# Patient Record
Sex: FEMALE | Race: WHITE | Employment: OTHER | URBAN - METROPOLITAN AREA
[De-identification: names, ages, dates, MRNs, and addresses within clinical notes are randomized per-mention and may not be internally consistent; named-entity substitution may affect disease eponyms.]

---

## 2019-01-24 ENCOUNTER — OP HISTORICAL/CONVERTED ENCOUNTER (OUTPATIENT)
Dept: OTHER | Age: 69
End: 2019-01-24

## 2020-10-29 RX ORDER — TRETINOIN 0.5 MG/G
CREAM TOPICAL
COMMUNITY
End: 2020-10-29 | Stop reason: SDUPTHER

## 2020-10-29 RX ORDER — TELMISARTAN AND HYDROCHLORTHIAZIDE 40; 12.5 MG/1; MG/1
1 TABLET ORAL DAILY
Qty: 30 TAB | Refills: 2 | Status: SHIPPED | OUTPATIENT
Start: 2020-10-29 | End: 2021-01-25 | Stop reason: SDUPTHER

## 2020-10-29 RX ORDER — TELMISARTAN AND HYDROCHLORTHIAZIDE 40; 12.5 MG/1; MG/1
1 TABLET ORAL DAILY
COMMUNITY
End: 2020-10-29 | Stop reason: SDUPTHER

## 2020-10-29 RX ORDER — TRETINOIN 0.5 MG/G
CREAM TOPICAL
Qty: 20 G | Refills: 4 | Status: SHIPPED | OUTPATIENT
Start: 2020-10-29 | End: 2020-11-09 | Stop reason: ALTCHOICE

## 2020-11-09 ENCOUNTER — OFFICE VISIT (OUTPATIENT)
Dept: FAMILY MEDICINE CLINIC | Age: 70
End: 2020-11-09
Payer: MEDICARE

## 2020-11-09 VITALS
HEART RATE: 92 BPM | SYSTOLIC BLOOD PRESSURE: 184 MMHG | DIASTOLIC BLOOD PRESSURE: 71 MMHG | TEMPERATURE: 97.3 F | HEIGHT: 65 IN | BODY MASS INDEX: 20.83 KG/M2 | OXYGEN SATURATION: 95 % | WEIGHT: 125 LBS

## 2020-11-09 DIAGNOSIS — Z12.31 SCREENING MAMMOGRAM, ENCOUNTER FOR: ICD-10-CM

## 2020-11-09 DIAGNOSIS — M81.0 OSTEOPOROSIS, UNSPECIFIED OSTEOPOROSIS TYPE, UNSPECIFIED PATHOLOGICAL FRACTURE PRESENCE: ICD-10-CM

## 2020-11-09 DIAGNOSIS — R05.9 COUGH: ICD-10-CM

## 2020-11-09 DIAGNOSIS — I10 ESSENTIAL HYPERTENSION: Primary | ICD-10-CM

## 2020-11-09 DIAGNOSIS — E78.2 MIXED HYPERLIPIDEMIA: ICD-10-CM

## 2020-11-09 PROCEDURE — 99213 OFFICE O/P EST LOW 20 MIN: CPT | Performed by: FAMILY MEDICINE

## 2020-11-09 RX ORDER — BISMUTH SUBSALICYLATE 262 MG
1 TABLET,CHEWABLE ORAL DAILY
COMMUNITY

## 2020-11-09 RX ORDER — ALENDRONATE SODIUM 70 MG/1
TABLET ORAL
COMMUNITY
End: 2020-11-09 | Stop reason: ALTCHOICE

## 2020-11-09 RX ORDER — GUAIFENESIN 600 MG/1
600 TABLET, EXTENDED RELEASE ORAL 2 TIMES DAILY
Qty: 60 TAB | Refills: 2 | Status: SHIPPED | OUTPATIENT
Start: 2020-11-09 | End: 2021-05-10 | Stop reason: ALTCHOICE

## 2020-11-09 RX ORDER — ATORVASTATIN CALCIUM 20 MG/1
TABLET, FILM COATED ORAL DAILY
COMMUNITY
End: 2021-01-25 | Stop reason: SDUPTHER

## 2020-11-09 NOTE — PATIENT INSTRUCTIONS
General Health and concerns:  HEART HEALTHY DIET:  A heart healthy diet is one that is low in cholesterol (less than 300 mg daily), fat (less than 80 g daily) . You should also minimize carbohydrates / sugars (less amounts of breads, pastas, potato and potato products and sugary foods/snacks, cookies, cakes, etc) . Try to eat whole wheat/multigrain breads and pastas and eat more vegetables. Cook with olive oil (or no oil) and grill, bake, broil or boil foods. Less red meat and more chicken , fish and lean cuts of beef (limited). 2792-2663 calories per day is sufficient [id number] is acceptable for weight loss. EXERCISE:  You should do exercise 3-5 days per week (minimum) to include increasing your heart rate for 30 to 45 minutes. At least a pace of a brisk walk should do that. This build up your heart and lung endurance and muscles and helps many function of the body. OTHER:        Routine Health maintenance: You need to get a yearly follow up/physical exam to review, discuss age and gender appropriate exams, labs, vaccines and screening tests. This includes cardiovascular health risk, cancer screens and other concepcion related topics. Medications-take all medications as directed. Please do not stop unless you talk to your doctor or health care provider first. Report any problems immediately. Referrals: if you have been given a referral, please call the office if you do not hear from provider in one week. You may make the appointment yourself. Please keep all appointments with specialists and ask them to send their notes, thoughts, recommendations to us , as your PCP. Imaging/Labs:  Be sure to get these images in a timely manner. IF your test must be scheduled, let us know if you need help getting this done and if you do not hear from that provider in a week , call us or them.   BE SURE to call the office if you do not hear regarding the results in one week after the test is performed Image or lab). It is our intention to inform you of the results ALWAYS, even if normal you should get a notification (Call, portal message). PLEASE eric if you do not get the results. PLEASE follow all recommendations and call/come in /ask questions if you do not understand of if problems develop after or in between visits. Failure to comply with recommended health care advise could result in serious health consequences. Thank you for choosing our practice and please let us know how we can help you feel better and stay well!

## 2020-11-17 ENCOUNTER — HOSPITAL ENCOUNTER (OUTPATIENT)
Dept: GENERAL RADIOLOGY | Age: 70
Discharge: HOME | End: 2020-11-17
Payer: MEDICARE

## 2020-11-17 DIAGNOSIS — R05.9 COUGH: ICD-10-CM

## 2020-11-17 PROCEDURE — 71046 X-RAY EXAM CHEST 2 VIEWS: CPT

## 2020-11-18 LAB
ALBUMIN SERPL-MCNC: 4.7 G/DL (ref 3.8–4.8)
ALBUMIN/GLOB SERPL: 2.5 {RATIO} (ref 1.2–2.2)
ALP SERPL-CCNC: 61 IU/L (ref 39–117)
ALT SERPL-CCNC: 19 IU/L (ref 0–32)
APPEARANCE UR: CLEAR
AST SERPL-CCNC: 23 IU/L (ref 0–40)
BASOPHILS # BLD AUTO: 0 X10E3/UL (ref 0–0.2)
BASOPHILS NFR BLD AUTO: 1 %
BILIRUB SERPL-MCNC: 0.5 MG/DL (ref 0–1.2)
BILIRUB UR QL STRIP: NEGATIVE
BUN SERPL-MCNC: 12 MG/DL (ref 8–27)
BUN/CREAT SERPL: 21 (ref 12–28)
CALCIUM SERPL-MCNC: 9.3 MG/DL (ref 8.7–10.3)
CHLORIDE SERPL-SCNC: 88 MMOL/L (ref 96–106)
CHOLEST SERPL-MCNC: 171 MG/DL (ref 100–199)
CO2 SERPL-SCNC: 25 MMOL/L (ref 20–29)
COLOR UR: YELLOW
CREAT SERPL-MCNC: 0.56 MG/DL (ref 0.57–1)
EOSINOPHIL # BLD AUTO: 0.1 X10E3/UL (ref 0–0.4)
EOSINOPHIL NFR BLD AUTO: 1 %
ERYTHROCYTE [DISTWIDTH] IN BLOOD BY AUTOMATED COUNT: 11.7 % (ref 11.7–15.4)
GLOBULIN SER CALC-MCNC: 1.9 G/DL (ref 1.5–4.5)
GLUCOSE SERPL-MCNC: 94 MG/DL (ref 65–99)
GLUCOSE UR QL: NEGATIVE
HCT VFR BLD AUTO: 36.6 % (ref 34–46.6)
HDLC SERPL-MCNC: 90 MG/DL
HGB BLD-MCNC: 12.2 G/DL (ref 11.1–15.9)
HGB UR QL STRIP: NEGATIVE
IMM GRANULOCYTES # BLD AUTO: 0 X10E3/UL (ref 0–0.1)
IMM GRANULOCYTES NFR BLD AUTO: 0 %
KETONES UR QL STRIP: NEGATIVE
LDLC SERPL CALC-MCNC: 68 MG/DL (ref 0–99)
LEUKOCYTE ESTERASE UR QL STRIP: NEGATIVE
LYMPHOCYTES # BLD AUTO: 2.5 X10E3/UL (ref 0.7–3.1)
LYMPHOCYTES NFR BLD AUTO: 29 %
MCH RBC QN AUTO: 31.7 PG (ref 26.6–33)
MCHC RBC AUTO-ENTMCNC: 33.3 G/DL (ref 31.5–35.7)
MCV RBC AUTO: 95 FL (ref 79–97)
MICRO URNS: ABNORMAL
MONOCYTES # BLD AUTO: 0.7 X10E3/UL (ref 0.1–0.9)
MONOCYTES NFR BLD AUTO: 8 %
NEUTROPHILS # BLD AUTO: 5.2 X10E3/UL (ref 1.4–7)
NEUTROPHILS NFR BLD AUTO: 61 %
NITRITE UR QL STRIP: NEGATIVE
PH UR STRIP: 8.5 [PH] (ref 5–7.5)
PLATELET # BLD AUTO: 371 X10E3/UL (ref 150–450)
POTASSIUM SERPL-SCNC: 4.5 MMOL/L (ref 3.5–5.2)
PROT SERPL-MCNC: 6.6 G/DL (ref 6–8.5)
PROT UR QL STRIP: NEGATIVE
RBC # BLD AUTO: 3.85 X10E6/UL (ref 3.77–5.28)
SODIUM SERPL-SCNC: 128 MMOL/L (ref 134–144)
SP GR UR: 1.01 (ref 1–1.03)
TRIGL SERPL-MCNC: 65 MG/DL (ref 0–149)
TSH SERPL DL<=0.005 MIU/L-ACNC: 0.97 UIU/ML (ref 0.45–4.5)
UROBILINOGEN UR STRIP-MCNC: 0.2 MG/DL (ref 0.2–1)
VLDLC SERPL CALC-MCNC: 13 MG/DL (ref 5–40)
WBC # BLD AUTO: 8.5 X10E3/UL (ref 3.4–10.8)

## 2020-11-19 NOTE — PROGRESS NOTES
Blood count is normal without infection or anemia  Sodium is a little low.   Need to recheck BMP and see, sometimes with overhydration it will get low OR the telmisartan with hctz may have to be changed  Kidney function and liver function are normal  Cholesterol is good  Urine is nromal

## 2020-11-23 DIAGNOSIS — E78.2 MIXED HYPERLIPIDEMIA: Primary | ICD-10-CM

## 2020-11-23 DIAGNOSIS — E87.1 HYPONATREMIA: ICD-10-CM

## 2020-11-23 NOTE — PROGRESS NOTES
72 y.o. , Jeffrey Ch , female       No Known Allergies  Current Outpatient Medications on File Prior to Visit   Medication Sig Dispense Refill    atorvastatin (LIPITOR) 20 mg tablet Take  by mouth daily.  aspirin (ASPIR-81 PO) Take  by mouth.  ubidecarenone (CO Q-10 PO) Take  by mouth.  ascorbic acid (VITAMIN C PO) Take  by mouth.  multivitamin (ONE A DAY) tablet Take 1 Tab by mouth daily.  guaiFENesin ER (MUCINEX) 600 mg ER tablet Take 1 Tab by mouth two (2) times a day. 60 Tab 2    telmisartan-hydroCHLOROthiazide (MICARDIS HCT) 40-12.5 mg per tablet Take 1 Tab by mouth daily. 30 Tab 2     No current facility-administered medications on file prior to visit. Patient Active Problem List   Diagnosis Code    Hypertension I10    Hyperlipidemia E78.5    Osteoporosis M81.0       1. Mixed hyperlipidemia    - METABOLIC PANEL, COMPREHENSIVE    2.  Hyponatremia    - METABOLIC PANEL, COMPREHENSIVE      Arturo Mayorga DO

## 2020-12-07 ENCOUNTER — HOSPITAL ENCOUNTER (OUTPATIENT)
Dept: MAMMOGRAPHY | Age: 70
Discharge: HOME | End: 2020-12-07
Attending: FAMILY MEDICINE
Payer: MEDICARE

## 2020-12-07 DIAGNOSIS — Z12.31 SCREENING MAMMOGRAM, ENCOUNTER FOR: ICD-10-CM

## 2020-12-07 PROCEDURE — 77067 SCR MAMMO BI INCL CAD: CPT

## 2020-12-29 LAB
ALBUMIN SERPL-MCNC: 4.5 G/DL (ref 3.8–4.8)
ALBUMIN/GLOB SERPL: 2.4 {RATIO} (ref 1.2–2.2)
ALP SERPL-CCNC: 79 IU/L (ref 39–117)
ALT SERPL-CCNC: 16 IU/L (ref 0–32)
AST SERPL-CCNC: 19 IU/L (ref 0–40)
BILIRUB SERPL-MCNC: 0.2 MG/DL (ref 0–1.2)
BUN SERPL-MCNC: 11 MG/DL (ref 8–27)
BUN/CREAT SERPL: 16 (ref 12–28)
CALCIUM SERPL-MCNC: 9.2 MG/DL (ref 8.7–10.3)
CHLORIDE SERPL-SCNC: 95 MMOL/L (ref 96–106)
CO2 SERPL-SCNC: 26 MMOL/L (ref 20–29)
CREAT SERPL-MCNC: 0.67 MG/DL (ref 0.57–1)
GLOBULIN SER CALC-MCNC: 1.9 G/DL (ref 1.5–4.5)
GLUCOSE SERPL-MCNC: 131 MG/DL (ref 65–99)
POTASSIUM SERPL-SCNC: 4.5 MMOL/L (ref 3.5–5.2)
PROT SERPL-MCNC: 6.4 G/DL (ref 6–8.5)
SODIUM SERPL-SCNC: 134 MMOL/L (ref 134–144)

## 2020-12-29 NOTE — PROGRESS NOTES
The glucose is 131. This is not diabetic but self. However, it is up from last visit. I don't know that she was fasting if not that's okay. However, so we may want to do an A1c. However, I believe we are rechecking her sodium which is much better. So she may continue well currently what she is doing.

## 2021-01-04 ENCOUNTER — TELEPHONE (OUTPATIENT)
Dept: FAMILY MEDICINE CLINIC | Age: 71
End: 2021-01-04

## 2021-01-04 NOTE — TELEPHONE ENCOUNTER
Spoke with pt. States that she has been having burning and numbness in feet for a long time. She keeps forgetting to mention it when she comes in. Wants to know if she should come in to see you.

## 2021-01-11 ENCOUNTER — OFFICE VISIT (OUTPATIENT)
Dept: FAMILY MEDICINE CLINIC | Age: 71
End: 2021-01-11
Payer: MEDICARE

## 2021-01-11 VITALS
TEMPERATURE: 98 F | HEART RATE: 87 BPM | OXYGEN SATURATION: 97 % | BODY MASS INDEX: 20.99 KG/M2 | WEIGHT: 126 LBS | DIASTOLIC BLOOD PRESSURE: 73 MMHG | SYSTOLIC BLOOD PRESSURE: 160 MMHG | HEIGHT: 65 IN

## 2021-01-11 DIAGNOSIS — E78.2 MIXED HYPERLIPIDEMIA: ICD-10-CM

## 2021-01-11 DIAGNOSIS — G62.9 NEUROPATHY: ICD-10-CM

## 2021-01-11 DIAGNOSIS — I10 ESSENTIAL HYPERTENSION: Primary | ICD-10-CM

## 2021-01-11 PROCEDURE — 99214 OFFICE O/P EST MOD 30 MIN: CPT | Performed by: FAMILY MEDICINE

## 2021-01-11 RX ORDER — DIPHENHYDRAMINE CITRATE AND IBUPROFEN 38; 200 MG/1; MG/1
TABLET, COATED ORAL
COMMUNITY
End: 2021-06-16 | Stop reason: ALTCHOICE

## 2021-01-11 NOTE — PATIENT INSTRUCTIONS
     
Routine Health maintenance: You need to get a yearly follow up/physical exam to review, discuss age and gender appropriate exams, labs, vaccines and screening tests. This includes cardiovascular health risk, cancer screens and other concepcion related topics. Medications-Take all medications as directed. Please do not stop unless you talk to your doctor or health care provider first. Report any problems immediately. Referrals: if you have been given a referral, please call the office if you do not hear from provider in one week. You may make the appointment yourself. Please keep all appointments with specialists and ask them to send their notes, thoughts, recommendations to us , as your PCP. Imaging/Labs:  Be sure to get these images in a timely manner. IF your test must be scheduled, let us know if you need help getting this done and if you do not hear from that provider in a week , call us or them. BE SURE to call the office if you do not hear regarding the results in one week after the test is performed Image or lab). It is our intention to inform you of the results ALWAYS, even if normal you should get a notification (Call, portal message). PLEASE eric if you do not get the results. PLEASE follow all recommendations and call/come in /ask questions if you do not understand of if problems develop after or in between visits. Failure to comply with recommended health care advise could result in serious health consequences. Thank you for choosing our practice and please let us know how we can help you feel better and stay well!

## 2021-01-11 NOTE — PROGRESS NOTES
IIDENTIFYING INFORMATION:  Dory Dumont. Jessica , 72 y.o., female      CHIEF COMPLAINT:   Chief Complaint   Patient presents with    Numbness     c/o tingling and burning sensation in feet. HISTORY OF PRESENT ILLNESS:    Christopher Cuadra is a 72 y.o. female with the below listed medical problems whom comes in today for numbness feet, longstanding just forgot to tell us. NO longer smokes and thought it was a circulatory issue from that. Quit smoking a couple months prior. She has no diabetes pper se and labs, including cbc, cmp, lipids, tsh normal in 2020. Nsame regardless of what she does but worse at night. Just burning and tingling, occasionally numb, bother feet. PAST MEDICAL HISTORY:   Patient Active Problem List   Diagnosis Code    Hypertension I10    Hyperlipidemia E78.5    Osteoporosis M81.0        ALLERGIES:   No Known Allergies      MEDICATIONS:     Current Outpatient Medications:     Ibuprofen-diphenhydrAMINE (Advil PM) 200-38 mg tab, Take  by mouth., Disp: , Rfl:     atorvastatin (LIPITOR) 20 mg tablet, Take  by mouth daily. , Disp: , Rfl:     aspirin (ASPIR-81 PO), Take  by mouth., Disp: , Rfl:     ubidecarenone (CO Q-10 PO), Take  by mouth., Disp: , Rfl:     ascorbic acid (VITAMIN C PO), Take  by mouth., Disp: , Rfl:     multivitamin (ONE A DAY) tablet, Take 1 Tab by mouth daily. , Disp: , Rfl:     guaiFENesin ER (MUCINEX) 600 mg ER tablet, Take 1 Tab by mouth two (2) times a day., Disp: 60 Tab, Rfl: 2    telmisartan-hydroCHLOROthiazide (MICARDIS HCT) 40-12.5 mg per tablet, Take 1 Tab by mouth daily. , Disp: 30 Tab, Rfl: 2    SOCIAL HISTORY:   Social History     Tobacco Use    Smoking status: Former Smoker     Quit date: 2020     Years since quittin.3    Smokeless tobacco: Never Used   Substance Use Topics    Alcohol use:  Yes    Drug use: Never       SURGICAL HISTORY:  Past Surgical History:   Procedure Laterality Date    HX COLONOSCOPY      due 2019 but due to COVID-19 will wait    HX HEENT      open up duct in eye          FAMILY HISTORY:  History reviewed. No pertinent family history. REVIEW OF SYSTEMS:  I personally collected this information from all available source present (patient/others in room and records available) -JLEWISDO    Review of Systems   Constitutional: Negative for activity change, appetite change, chills, diaphoresis, fever and unexpected weight change. HENT: Negative for congestion, ear discharge, ear pain, hearing loss, rhinorrhea, sinus pressure, sneezing, sore throat, tinnitus, trouble swallowing and voice change. Eyes: Negative for photophobia, pain, discharge and visual disturbance. Respiratory: Negative for cough, chest tightness, shortness of breath and wheezing. Cardiovascular: Negative for chest pain, palpitations and leg swelling. Gastrointestinal: Negative for abdominal distention, abdominal pain, blood in stool, constipation, diarrhea, nausea and vomiting. Endocrine: Negative for cold intolerance, heat intolerance, polydipsia and polyphagia. Genitourinary: Negative for difficulty urinating, dysuria, frequency, hematuria and urgency. Musculoskeletal: Negative for arthralgias, back pain, gait problem, joint swelling, myalgias and neck pain. Skin: Negative for color change and rash. Neurological: Positive for numbness (and burning, and tingling). Negative for dizziness, tremors, syncope, speech difficulty, weakness, light-headedness and headaches. Hematological: Negative for adenopathy. Does not bruise/bleed easily. Psychiatric/Behavioral: Negative for agitation, behavioral problems, confusion, decreased concentration, dysphoric mood, hallucinations, sleep disturbance and suicidal ideas. The patient is not nervous/anxious.                  PHYSICAL EXAMINATION:  Vital Signs:    Visit Vitals  BP (!) 160/73 (BP 1 Location: Left arm, BP Patient Position: Sitting)   Pulse 87   Temp 98 °F (36.7 °C) (Temporal) Ht 5' 5\" (1.651 m)   Wt 126 lb (57.2 kg)   SpO2 97%   BMI 20.97 kg/m²         Wt Readings from Last 3 Encounters:   01/11/21 126 lb (57.2 kg)   11/09/20 125 lb (56.7 kg)     BP Readings from Last 3 Encounters:   01/11/21 (!) 160/73   11/09/20 (!) 184/71         Physical Exam  Nursing note reviewed. Constitutional:       General: She is not in acute distress. Appearance: Normal appearance. She is not ill-appearing or toxic-appearing. HENT:      Right Ear: Tympanic membrane, ear canal and external ear normal.      Left Ear: Tympanic membrane, ear canal and external ear normal.      Nose: No congestion or rhinorrhea. Mouth/Throat:      Pharynx: No oropharyngeal exudate or posterior oropharyngeal erythema. Eyes:      General: No scleral icterus. Extraocular Movements: Extraocular movements intact. Conjunctiva/sclera: Conjunctivae normal.      Pupils: Pupils are equal, round, and reactive to light. Neck:      Musculoskeletal: No neck rigidity or muscular tenderness. Vascular: No carotid bruit. Cardiovascular:      Rate and Rhythm: Normal rate and regular rhythm. Heart sounds: No murmur. No friction rub. No gallop. Pulmonary:      Effort: Pulmonary effort is normal.      Breath sounds: Normal breath sounds. No wheezing, rhonchi or rales. Abdominal:      General: Bowel sounds are normal. There is no distension. Palpations: Abdomen is soft. There is no mass. Tenderness: There is no abdominal tenderness. Musculoskeletal:         General: No swelling, tenderness or deformity. Right lower leg: No edema. Left lower leg: No edema. Lymphadenopathy:      Cervical: No cervical adenopathy. Skin:     Capillary Refill: Capillary refill takes less than 2 seconds. Coloration: Skin is not jaundiced. Findings: No erythema or rash. Neurological:      General: No focal deficit present. Mental Status: She is alert and oriented to person, place, and time. Mental status is at baseline. Cranial Nerves: No cranial nerve deficit. Sensory: No sensory deficit. Motor: No weakness. Coordination: Coordination normal.      Gait: Gait normal.      Deep Tendon Reflexes: Reflexes normal.      Comments: There was no monofilament abnormalities bilateral feet and at least 10 plantar points and 4 dorsal points. However, the sensation was a little different on the left L4-5 versus right. Foot hygiene was normal with no lesions or erythema. No ulcers. No calluses. Psychiatric:         Mood and Affect: Mood normal.         Behavior: Behavior normal.         Thought Content: Thought content normal.         Judgment: Judgment normal.         ASSESSMENT/PLAN:          Discussion (regarding today's visit):  Christopher Cuadra and LINDSAY did discuss the below listed Items and issues and all questions were answered to her satisfaction. WE also reviewed and discussed each diagnosis listed for today's visit including medications, treatment, testing such as labs, imagine, referrals and when to call regarding results and appointments. Reminded patient to keep any and all appointments with specialists, labs, imaging. Reminded patient to make sure we get copies of any specialists care, labs and imaging. Reminded patient to call of come by the office if there are any concerns, questions , comments or problems. The patient verbalized understanding of the care plan and all questions were answered to the patient's satisfaction prior to leaving the office. The patient was told that failure to comply with recommended testing could result in abnormal health consequences. The patient was instructed to have yearly routine health maintenance including but not limited to age appropriate vaccines, testing, screening exams. 1. Essential hypertension  Stable, continue medications    2. Mixed hyperlipidemia  Stable, labs up to date    3.  Neuropathy  NE#eds specific testing because I find possible radicular issues but she reports bilateral.  Did not test vibratory sensation.   - REFERRAL TO NEUROLOGY      Follow-up and Dispositions    · Return if symptoms worsen or fail to improve. The patient actively participated in medical decision making. FOLLOW UP:   Patient knows to keep any and all future visits scheduled unless told otherwise. Patient knows to call, come back if any concerns, questions, comments or problems arise. This visit may have been completed , in part, with voice recognition software as well as typing and may have syntax errors despite editing.       Sanjiv Poole, DO

## 2021-01-25 RX ORDER — TELMISARTAN AND HYDROCHLORTHIAZIDE 40; 12.5 MG/1; MG/1
1 TABLET ORAL DAILY
Qty: 30 TAB | Refills: 2 | Status: SHIPPED | OUTPATIENT
Start: 2021-01-25 | End: 2021-04-28 | Stop reason: SDUPTHER

## 2021-01-25 RX ORDER — ATORVASTATIN CALCIUM 20 MG/1
20 TABLET, FILM COATED ORAL DAILY
Qty: 90 TAB | Refills: 1 | Status: SHIPPED | OUTPATIENT
Start: 2021-01-25 | End: 2021-07-27 | Stop reason: SDUPTHER

## 2021-04-28 RX ORDER — TELMISARTAN AND HYDROCHLORTHIAZIDE 40; 12.5 MG/1; MG/1
1 TABLET ORAL DAILY
Qty: 90 TAB | Refills: 1 | Status: SHIPPED | OUTPATIENT
Start: 2021-04-28 | End: 2021-10-25 | Stop reason: SDUPTHER

## 2021-05-10 ENCOUNTER — OFFICE VISIT (OUTPATIENT)
Dept: FAMILY MEDICINE CLINIC | Age: 71
End: 2021-05-10
Payer: MEDICARE

## 2021-05-10 VITALS
WEIGHT: 125 LBS | SYSTOLIC BLOOD PRESSURE: 144 MMHG | OXYGEN SATURATION: 96 % | HEIGHT: 65 IN | TEMPERATURE: 97.7 F | DIASTOLIC BLOOD PRESSURE: 82 MMHG | BODY MASS INDEX: 20.83 KG/M2 | RESPIRATION RATE: 16 BRPM | HEART RATE: 77 BPM

## 2021-05-10 DIAGNOSIS — I10 ESSENTIAL HYPERTENSION: ICD-10-CM

## 2021-05-10 DIAGNOSIS — E78.2 MIXED HYPERLIPIDEMIA: ICD-10-CM

## 2021-05-10 DIAGNOSIS — G62.9 NEUROPATHY: Primary | ICD-10-CM

## 2021-05-10 PROCEDURE — G8510 SCR DEP NEG, NO PLAN REQD: HCPCS | Performed by: FAMILY MEDICINE

## 2021-05-10 PROCEDURE — 3017F COLORECTAL CA SCREEN DOC REV: CPT | Performed by: FAMILY MEDICINE

## 2021-05-10 PROCEDURE — 99213 OFFICE O/P EST LOW 20 MIN: CPT | Performed by: FAMILY MEDICINE

## 2021-05-10 PROCEDURE — G8754 DIAS BP LESS 90: HCPCS | Performed by: FAMILY MEDICINE

## 2021-05-10 PROCEDURE — 1101F PT FALLS ASSESS-DOCD LE1/YR: CPT | Performed by: FAMILY MEDICINE

## 2021-05-10 PROCEDURE — G8420 CALC BMI NORM PARAMETERS: HCPCS | Performed by: FAMILY MEDICINE

## 2021-05-10 PROCEDURE — G9899 SCRN MAM PERF RSLTS DOC: HCPCS | Performed by: FAMILY MEDICINE

## 2021-05-10 PROCEDURE — 1090F PRES/ABSN URINE INCON ASSESS: CPT | Performed by: FAMILY MEDICINE

## 2021-05-10 PROCEDURE — G8753 SYS BP > OR = 140: HCPCS | Performed by: FAMILY MEDICINE

## 2021-05-10 PROCEDURE — G8536 NO DOC ELDER MAL SCRN: HCPCS | Performed by: FAMILY MEDICINE

## 2021-05-10 PROCEDURE — G8427 DOCREV CUR MEDS BY ELIG CLIN: HCPCS | Performed by: FAMILY MEDICINE

## 2021-05-10 RX ORDER — MENTHOL
1000 GEL (GRAM) TOPICAL DAILY
COMMUNITY

## 2021-05-10 NOTE — PROGRESS NOTES
Chief Complaint   Patient presents with    Follow-up     Follow up lipids, bp and copd    Cholesterol Problem    Hypertension    COPD     1. Have you been to the ER, urgent care clinic since your last visit? Hospitalized since your last visit? No    2. Have you seen or consulted any other health care providers outside of the [address] [place] [address] since your last visit? Include any pap smears or colon screening. No - She has been to neurologist - referral from our office. \\\  Visit Vitals  BP (!) 144/82 (BP 1 Location: Left arm, BP Patient Position: Sitting, BP Cuff Size: Adult)   Pulse 77   Temp 97.7 °F (36.5 °C) (Temporal)   Resp 16   Ht 5' 4.5\" (1.638 m)   Wt 125 lb (56.7 kg)   SpO2 96%   BMI 21.12 kg/m²     Patient takes her BP medication at night.

## 2021-05-10 NOTE — PROGRESS NOTES
IDENTIFYING INFORMATION:  Parveen Ray. Howe , 72 y.o., female  [address]  [place] 66         CHIEF COMPLAINT:     Chief Complaint   Patient presents with    Follow-up     Follow up lipids, bp and copd    Cholesterol Problem    Hypertension    COPD         HISTORY OF PRESENT ILLNESS:    Brianna Moy is a 72 y.o. female  has a past medical history of Hyperlipidemia, Hypertension, Neuropathy, and Osteoporosis. Artem Lawson she comes in for follow up, 6 month and was diagnosed with neuropathy, not severe as of yet but has been confirmed buyt her neruo. Not sure she wants anything for that. Somewhat thinning hair on top. Gets hair dyed at the roots every 6 weeks. NO chest pain, short of breath, leg pain, edema. Labs from November reviewed. PAST MEDICAL HISTORY:     Past Medical History:   Diagnosis Date    Hyperlipidemia     Hypertension     Neuropathy     bilateral feet     Osteoporosis        MEDICATIONS:     Current Outpatient Medications on File Prior to Visit   Medication Sig Dispense Refill    vitamin e (E [facility]) 1,000 unit capsule Take 1,000 Units by mouth daily.  telmisartan-hydroCHLOROthiazide (MICARDIS HCT) 40-12.5 mg per tablet Take 1 Tab by mouth daily. 90 Tab 1    atorvastatin (LIPITOR) 20 mg tablet Take 1 Tab by mouth daily. 90 Tab 1    Ibuprofen-diphenhydrAMINE (Advil PM) 200-38 mg tab Take  by mouth.  aspirin (ASPIR-81 PO) Take  by mouth.  ascorbic acid (VITAMIN C PO) Take  by mouth.  multivitamin (ONE A DAY) tablet Take 1 Tab by mouth daily.  [DISCONTINUED] guaiFENesin ER (MUCINEX) 600 mg ER tablet Take 1 Tab by mouth two (2) times a day. 60 Tab 2    [DISCONTINUED] ubidecarenone (CO Q-10 PO) Take  by mouth. No current facility-administered medications on file prior to visit.         ALLERGIES:    No Known Allergies      SOCIAL HISTORY:     Social History     Tobacco Use    Smoking status: Former Smoker     Packs/day: 1.50     Years: 50.00 Pack years: 75.00     Types: Cigarettes     Quit date: 2020     Years since quittin.6    Smokeless tobacco: Never Used   Substance Use Topics    Alcohol use: Yes     Drinks per session: 1 or 2     Binge frequency: Never     Comment: special occassions     Drug use: Never       SURGICAL HISTORY:    Past Surgical History:   Procedure Laterality Date    HX COLONOSCOPY      due 2019 but due to COVID-19 will wait    HX HEENT      open up duct in eye        FAMILY HISTORY:    Family History   Problem Relation Age of Onset    Dementia Mother     Heart Disease Father         valve problem     Cancer Brother         Leukemia     Diabetes Brother     Heart Attack Brother         x3    Stroke Brother         x3         REVIEW OF SYSTEMS:    I personally collected this information from all available source present (patient/others in room and records available) -JLEWISDO    Review of Systems   Constitutional: Positive for malaise/fatigue. Negative for chills, diaphoresis, fever and weight loss. HENT: Negative for congestion, ear pain, hearing loss, nosebleeds, sinus pain, sore throat and tinnitus. Eyes: Negative for blurred vision, double vision, photophobia and pain. Respiratory: Negative for cough, sputum production and shortness of breath. Cardiovascular: Negative for chest pain, palpitations, orthopnea, claudication, leg swelling and PND. Gastrointestinal: Negative for abdominal pain, blood in stool, constipation, diarrhea, heartburn, melena, nausea and vomiting. Genitourinary: Negative for dysuria, frequency and urgency. Musculoskeletal: Negative for back pain, falls, joint pain, myalgias and neck pain. Skin: Negative for itching and rash. Neurological: Positive for tingling and sensory change. Negative for dizziness, tremors, focal weakness and headaches. Endo/Heme/Allergies:        Hair loss, fatigue   Psychiatric/Behavioral: Negative for depression and suicidal ideas.  The patient is not nervous/anxious and does not have insomnia. PHYSICAL EXAMINATION:    Vital Signs:    Visit Vitals  BP (!) 144/82 (BP 1 Location: Left arm, BP Patient Position: Sitting, BP Cuff Size: Adult)   Pulse 77   Temp 97.7 °F (36.5 °C) (Temporal)   Resp 16   Ht 5' 4.5\" (1.638 m)   Wt 125 lb (56.7 kg)   SpO2 96%   BMI 21.12 kg/m²         Wt Readings from Last 3 Encounters:   05/10/21 125 lb (56.7 kg)   01/11/21 126 lb (57.2 kg)   11/09/20 125 lb (56.7 kg)     BP Readings from Last 3 Encounters:   05/10/21 (!) 144/82   01/11/21 (!) 160/73   11/09/20 (!) 184/71         Physical Exam  Vitals signs reviewed. Constitutional:       General: She is not in acute distress. Appearance: She is not ill-appearing. Eyes:      General: No scleral icterus. Extraocular Movements: Extraocular movements intact. Conjunctiva/sclera: Conjunctivae normal.      Pupils: Pupils are equal, round, and reactive to light. Neck:      Musculoskeletal: No neck rigidity or muscular tenderness. Vascular: No carotid bruit. Cardiovascular:      Rate and Rhythm: Normal rate and regular rhythm. Pulses: Normal pulses. Heart sounds: No murmur. No friction rub. No gallop. Pulmonary:      Effort: Pulmonary effort is normal.      Breath sounds: Normal breath sounds. No wheezing, rhonchi or rales. Abdominal:      General: There is no distension. Palpations: Abdomen is soft. There is no mass. Tenderness: There is no abdominal tenderness. There is no guarding. Musculoskeletal:         General: No swelling, tenderness, deformity or signs of injury. Right lower leg: No edema. Left lower leg: No edema. Lymphadenopathy:      Cervical: No cervical adenopathy. Skin:     General: Skin is warm and dry. Capillary Refill: Capillary refill takes less than 2 seconds. Coloration: Skin is not jaundiced. Findings: No bruising, erythema or rash.    Neurological:      General: No focal deficit present. Mental Status: She is alert and oriented to person, place, and time. Cranial Nerves: No cranial nerve deficit. Sensory: No sensory deficit. Motor: No weakness. Coordination: Coordination normal.      Gait: Gait normal.      Deep Tendon Reflexes: Reflexes normal.   Psychiatric:         Mood and Affect: Mood normal.         Behavior: Behavior normal.         Thought Content: Thought content normal.         Judgment: Judgment normal.           ASSESSMENT/PLAN:      Discussion (regarding today's visit with Jacob Fuel);  Neuropathy is work-up in progress.  Continue with neurological care. She does do not think it is affecting her quality of life at this point. · If she changes her mind or it starts to be a problem we can try some gabapentin or amitriptyline. Continue medicines as otherwise prescribed. · Hair loss seems to be normal aging pattern. · Consider lab work if continues to get worse. Consider dermatological will consult if needed. ICD-10-CM ICD-9-CM    1. Neuropathy  G62.9 355.9    2. Mixed hyperlipidemia  E78.2 272.2    3. Essential hypertension  I10 401.9        WE reviewed each diagnosis listed for today's visit including medications, treatment, testing such as labs, imagine, referrals and when to call regarding results and appointments. Reminded patient to keep any and all appointments with specialists, labs, imaging. Reminded patient to make sure we get copies of any specialists care, labs and imaging. Reminded patient to call of come by the office if there are any concerns, questions , comments or problems. The patient verbalized understanding of the care plan and all questions were answered to the patient's satisfaction prior to leaving the office. The patient was told that failure to comply with recommended testing could result in abnormal health consequences.     The patient was instructed to have yearly routine health maintenance including but not limited to age appropriate vaccines, testing, screening exams. ALL questions were answered to her satisfaction before leaving the office. The patient actively participated in medical decision making. FOLLOW UP:     Patient knows to keep any and all future visits scheduled unless told otherwise. Patient knows to call, come back if any concerns, questions, comments or problems arise. Follow-up and Dispositions    · Return in about 6 months (around 11/10/2021) for follow up lipids, hair loss and medicare wellness. This visit may have been completed , in part, with voice recognition software as well as typing and may have syntax errors despite editing.       Edilma Agosto, DO

## 2021-06-03 ENCOUNTER — TELEPHONE (OUTPATIENT)
Dept: FAMILY MEDICINE CLINIC | Age: 71
End: 2021-06-03

## 2021-06-03 NOTE — TELEPHONE ENCOUNTER
Pt left message. States that she called the  to get an appt. C/o cold, not feeling well, HA, mucous, cough, and body aches. Wants to know what to do?

## 2021-06-14 ENCOUNTER — TELEPHONE (OUTPATIENT)
Dept: FAMILY MEDICINE CLINIC | Age: 71
End: 2021-06-14

## 2021-06-14 NOTE — TELEPHONE ENCOUNTER
Pt left message. States that she tried to make an appt about week ago and nothing was available. C/o still having cough. Wanting to know if a rx is needed or if she needs to be seen?

## 2021-06-16 ENCOUNTER — OFFICE VISIT (OUTPATIENT)
Dept: FAMILY MEDICINE CLINIC | Age: 71
End: 2021-06-16
Payer: MEDICARE

## 2021-06-16 VITALS
SYSTOLIC BLOOD PRESSURE: 139 MMHG | HEIGHT: 65 IN | OXYGEN SATURATION: 96 % | TEMPERATURE: 97.3 F | WEIGHT: 123.8 LBS | DIASTOLIC BLOOD PRESSURE: 83 MMHG | HEART RATE: 87 BPM | BODY MASS INDEX: 20.62 KG/M2

## 2021-06-16 DIAGNOSIS — R05.9 COUGH: Primary | ICD-10-CM

## 2021-06-16 DIAGNOSIS — J20.9 ACUTE BRONCHITIS, UNSPECIFIED ORGANISM: ICD-10-CM

## 2021-06-16 PROCEDURE — G8510 SCR DEP NEG, NO PLAN REQD: HCPCS | Performed by: FAMILY MEDICINE

## 2021-06-16 PROCEDURE — 1101F PT FALLS ASSESS-DOCD LE1/YR: CPT | Performed by: FAMILY MEDICINE

## 2021-06-16 PROCEDURE — G9899 SCRN MAM PERF RSLTS DOC: HCPCS | Performed by: FAMILY MEDICINE

## 2021-06-16 PROCEDURE — G8754 DIAS BP LESS 90: HCPCS | Performed by: FAMILY MEDICINE

## 2021-06-16 PROCEDURE — 99213 OFFICE O/P EST LOW 20 MIN: CPT | Performed by: FAMILY MEDICINE

## 2021-06-16 PROCEDURE — G8536 NO DOC ELDER MAL SCRN: HCPCS | Performed by: FAMILY MEDICINE

## 2021-06-16 PROCEDURE — 3017F COLORECTAL CA SCREEN DOC REV: CPT | Performed by: FAMILY MEDICINE

## 2021-06-16 PROCEDURE — 1090F PRES/ABSN URINE INCON ASSESS: CPT | Performed by: FAMILY MEDICINE

## 2021-06-16 PROCEDURE — G8752 SYS BP LESS 140: HCPCS | Performed by: FAMILY MEDICINE

## 2021-06-16 PROCEDURE — G8420 CALC BMI NORM PARAMETERS: HCPCS | Performed by: FAMILY MEDICINE

## 2021-06-16 PROCEDURE — G8427 DOCREV CUR MEDS BY ELIG CLIN: HCPCS | Performed by: FAMILY MEDICINE

## 2021-06-16 RX ORDER — MINERAL OIL
180 ENEMA (ML) RECTAL DAILY
Qty: 90 TABLET | Refills: 1 | Status: SHIPPED
Start: 2021-06-16 | End: 2021-12-29

## 2021-06-16 RX ORDER — AMOXICILLIN 500 MG/1
500 CAPSULE ORAL 3 TIMES DAILY
Qty: 30 CAPSULE | Refills: 0 | Status: SHIPPED | OUTPATIENT
Start: 2021-06-16 | End: 2021-06-26

## 2021-06-16 RX ORDER — METHYLPREDNISOLONE 4 MG/1
TABLET ORAL
Qty: 1 DOSE PACK | Refills: 0 | Status: SHIPPED | OUTPATIENT
Start: 2021-06-16 | End: 2021-11-10 | Stop reason: ALTCHOICE

## 2021-06-16 RX ORDER — ALBUTEROL SULFATE 90 UG/1
2 AEROSOL, METERED RESPIRATORY (INHALATION)
Qty: 1 INHALER | Refills: 1 | Status: SHIPPED
Start: 2021-06-16 | End: 2021-12-29

## 2021-06-16 NOTE — PROGRESS NOTES
IDENTIFYING INFORMATION:      Jovanna Goss. [place] , 72 y.o., female  [address]  [place]     Medical Record Number: [id number]          CHIEF COMPLAINT:     Chief Complaint   Patient presents with    Cough     c/o still having coughing. Coughs up whitish mucous when coughing hard. HISTORY OF PRESENT ILLNESS:    Sammie Cruz is a 72 y.o. female  has a past medical history of Hyperlipidemia, Hypertension, Neuropathy, and Osteoporosis. Jose L Kick she comes in for cough. Been about two weeks, all the time. NO sputum and occasionally has wheezing and short of breath. NO leg pain or edema. Tried some mucinex. No relief. Does not wake her up. Does have some mild copd and history of tobacco use. Started after a long sneezing spell, some pressure and facial pain,, worse when she goes outside. No fevers, chills, sweats. No nausea, vomiting, diarrhea. No dysuria, heistancy, urgency , frequency. PAST MEDICAL HISTORY:     Past Medical History:   Diagnosis Date    Hyperlipidemia     Hypertension     Neuropathy     bilateral feet     Osteoporosis        MEDICATIONS:     Current Outpatient Medications on File Prior to Visit   Medication Sig Dispense Refill    vitamin e (E GEMS) 1,000 unit capsule Take 1,000 Units by mouth daily.  telmisartan-hydroCHLOROthiazide (MICARDIS HCT) 40-12.5 mg per tablet Take 1 Tab by mouth daily. 90 Tab 1    atorvastatin (LIPITOR) 20 mg tablet Take 1 Tab by mouth daily. 90 Tab 1    Ibuprofen-diphenhydrAMINE (Advil PM) 200-38 mg tab Take  by mouth.  aspirin (ASPIR-81 PO) Take  by mouth.  ascorbic acid (VITAMIN C PO) Take  by mouth.  multivitamin (ONE A DAY) tablet Take 1 Tab by mouth daily. No current facility-administered medications on file prior to visit.        ALLERGIES:    No Known Allergies      SOCIAL HISTORY:     Social History     Tobacco Use    Smoking status: Former Smoker     Packs/day: 1.50     Years: 50.00     Pack years: 75.00     Types: Cigarettes     Quit date: 2020     Years since quittin.7    Smokeless tobacco: Never Used   Vaping Use    Vaping Use: Never used   Substance Use Topics    Alcohol use: Yes     Comment: special occassions     Drug use: Never       SURGICAL HISTORY:    Past Surgical History:   Procedure Laterality Date    HX COLONOSCOPY      due 2019 but due to COVID-19 will wait    HX HEENT      open up duct in eye        FAMILY HISTORY:    Family History   Problem Relation Age of Onset    Dementia Mother     Heart Disease Father         valve problem     Cancer Brother         Leukemia     Diabetes Brother     Heart Attack Brother         x3    Stroke Brother         x3         REVIEW OF SYSTEMS:    I personally collected this information from all available source present (patient/others in room and records available) -[email or web address]    Review of Systems   Constitutional: Negative for chills, fever, malaise/fatigue and weight loss. HENT: Positive for congestion, sinus pain and sore throat. Negative for ear pain and hearing loss. Eyes: Negative for blurred vision and double vision. Respiratory: Positive for cough, sputum production and wheezing. Negative for shortness of breath. Cardiovascular: Negative for chest pain, palpitations, orthopnea and leg swelling. Gastrointestinal: Negative for constipation, diarrhea, heartburn, nausea and vomiting. Genitourinary: Negative for dysuria, frequency and urgency. Musculoskeletal: Negative for back pain, myalgias and neck pain. Skin: Negative for itching and rash. Neurological: Positive for sensory change. Negative for dizziness, tingling and headaches. Psychiatric/Behavioral: Negative for depression. The patient is not nervous/anxious and does not have insomnia.         PHYSICAL EXAMINATION:    Vital Signs:    Visit Vitals  /83 (BP 1 Location: Right upper arm, BP Patient Position: Sitting)   Pulse 87   Temp 97.3 °F (36.3 °C) (Temporal) Ht 5' 4.5\" (1.638 m)   Wt 123 lb 12.8 oz (56.2 kg)   SpO2 96%   BMI 20.92 kg/m²         Wt Readings from Last 3 Encounters:   06/16/21 123 lb 12.8 oz (56.2 kg)   05/10/21 125 lb (56.7 kg)   01/11/21 126 lb (57.2 kg)     BP Readings from Last 3 Encounters:   06/16/21 139/83   05/10/21 (!) 144/82   01/11/21 (!) 160/73         Physical Exam  Constitutional:       General: She is not in acute distress. Appearance: Normal appearance. She is normal weight. She is not ill-appearing. HENT:      Right Ear: Tympanic membrane, ear canal and external ear normal.      Left Ear: Tympanic membrane, ear canal and external ear normal.      Nose: Congestion and rhinorrhea present. Mouth/Throat:      Mouth: Mucous membranes are moist.      Pharynx: No oropharyngeal exudate or posterior oropharyngeal erythema. Eyes:      General: No scleral icterus. Right eye: No discharge. Left eye: No discharge. Extraocular Movements: Extraocular movements intact. Conjunctiva/sclera: Conjunctivae normal.      Pupils: Pupils are equal, round, and reactive to light. Cardiovascular:      Rate and Rhythm: Normal rate and regular rhythm. Heart sounds: No murmur heard. No friction rub. No gallop. Pulmonary:      Effort: Pulmonary effort is normal.      Comments: Tight air movement  Musculoskeletal:      Cervical back: No rigidity or tenderness. Right lower leg: No edema. Left lower leg: No edema. Lymphadenopathy:      Cervical: No cervical adenopathy. Neurological:      General: No focal deficit present. Mental Status: She is oriented to person, place, and time. Mental status is at baseline. Psychiatric:         Mood and Affect: Mood normal.           ASSESSMENT/PLAN:      Discussion (regarding today's visit with Chandler Hutchinson);   Cough seems to be related to possibly allergies/sinuses but now has turned to bronchitis  I will give her an antihistamine, inhaler and Dosepak  Also give her an antibiotic  If she does not start to improve in 2 to 3 days she will let us know. ICD-10-CM ICD-9-CM    1. Cough  R05 786.2 fexofenadine (ALLEGRA) 180 mg tablet      amoxicillin (AMOXIL) 500 mg capsule      methylPREDNISolone (MEDROL DOSEPACK) 4 mg tablet      albuterol (PROVENTIL HFA, VENTOLIN HFA, PROAIR HFA) 90 mcg/actuation inhaler   2. Acute bronchitis, unspecified organism  J20.9 466.0 fexofenadine (ALLEGRA) 180 mg tablet      amoxicillin (AMOXIL) 500 mg capsule      methylPREDNISolone (MEDROL DOSEPACK) 4 mg tablet      albuterol (PROVENTIL HFA, VENTOLIN HFA, PROAIR HFA) 90 mcg/actuation inhaler       WE reviewed each diagnosis listed for today's visit including medications, treatment, testing such as labs, imagine, referrals and when to call regarding results and appointments. Reminded patient to keep any and all appointments with specialists, labs, imaging. Reminded patient to make sure we get copies of any specialists care, labs and imaging. Reminded patient to call of come by the office if there are any concerns, questions , comments or problems. The patient verbalized understanding of the care plan and all questions were answered to the patient's satisfaction prior to leaving the office. The patient was told that failure to comply with recommended testing could result in abnormal health consequences. The patient was instructed to have yearly routine health maintenance including but not limited to age appropriate vaccines, testing, screening exams. ALL questions were answered to her satisfaction before leaving the office. The patient actively participated in medical decision making. FOLLOW UP:     Patient knows to keep any and all future visits scheduled unless told otherwise. Patient knows to call, come back if any concerns, questions, comments or problems arise. Follow-up and Dispositions    · Return if symptoms worsen or fail to improve.                 Nevin Morgan Jignesh Paris DO    This visit was reviewed and signed electronically. It was been completed with voice recognition software and hand typing. It may have syntax and spelling errors despite editing.

## 2021-06-16 NOTE — PROGRESS NOTES
1. Have you been to the ER, urgent care clinic since your last visit? Hospitalized since your last visit? No    2. Have you seen or consulted any other health care providers outside of the [address] [place] [address] since your last visit? Include any pap smears or colon screening. [facility]     Chief Complaint   Patient presents with    Cough     c/o still having coughing. Coughs up whitish mucous when coughing hard.        Visit Vitals  /83 (BP 1 Location: Right upper arm, BP Patient Position: Sitting)   Pulse 87   Temp 97.3 °F (36.3 °C) (Temporal)   Ht 5' 4.5\" (1.638 m)   Wt 123 lb 12.8 oz (56.2 kg)   SpO2 96%   BMI 20.92 kg/m²

## 2021-07-27 DIAGNOSIS — E78.2 MIXED HYPERLIPIDEMIA: Primary | ICD-10-CM

## 2021-07-27 RX ORDER — ATORVASTATIN CALCIUM 20 MG/1
20 TABLET, FILM COATED ORAL DAILY
Qty: 90 TABLET | Refills: 1 | Status: SHIPPED
Start: 2021-07-27 | End: 2021-12-30

## 2021-10-25 RX ORDER — TELMISARTAN AND HYDROCHLORTHIAZIDE 40; 12.5 MG/1; MG/1
1 TABLET ORAL DAILY
Qty: 90 TABLET | Refills: 1 | Status: SHIPPED | OUTPATIENT
Start: 2021-10-25 | End: 2022-04-26 | Stop reason: SDUPTHER

## 2021-10-25 NOTE — TELEPHONE ENCOUNTER
----- Message from IZABELLAFRANCES LOWE [facility] sent at 10/25/2021  9:38 AM EDT -----  Subject: Refill Request    QUESTIONS  Name of Medication? telmisartan-hydroCHLOROthiazide (MICARDIS HCT) 40-12.5   mg per tablet  Patient-reported dosage and instructions? 40mg/12.5mg once a day by mouth   How many days do you have left? 4  Preferred Pharmacy? [id number] [facility] [place] phone number (if available)? [phone]  ---------------------------------------------------------------------------  --------------  Diana SIEGEL  What is the best way for the office to contact you? OK to leave message on   voicemail  Preferred Call Back Phone Number?  [phone]

## 2021-11-10 ENCOUNTER — OFFICE VISIT (OUTPATIENT)
Dept: FAMILY MEDICINE CLINIC | Age: 71
End: 2021-11-10
Payer: MEDICARE

## 2021-11-10 ENCOUNTER — HOSPITAL ENCOUNTER (OUTPATIENT)
Dept: GENERAL RADIOLOGY | Age: 71
Discharge: HOME | End: 2021-11-10
Payer: MEDICARE

## 2021-11-10 VITALS
HEART RATE: 81 BPM | TEMPERATURE: 97.7 F | WEIGHT: 122.4 LBS | BODY MASS INDEX: 20.39 KG/M2 | OXYGEN SATURATION: 95 % | DIASTOLIC BLOOD PRESSURE: 81 MMHG | SYSTOLIC BLOOD PRESSURE: 155 MMHG | HEIGHT: 65 IN

## 2021-11-10 DIAGNOSIS — Z12.31 SCREENING MAMMOGRAM, ENCOUNTER FOR: ICD-10-CM

## 2021-11-10 DIAGNOSIS — Z00.00 MEDICARE ANNUAL WELLNESS VISIT, SUBSEQUENT: ICD-10-CM

## 2021-11-10 DIAGNOSIS — G62.9 NEUROPATHY: ICD-10-CM

## 2021-11-10 DIAGNOSIS — I10 PRIMARY HYPERTENSION: ICD-10-CM

## 2021-11-10 DIAGNOSIS — E78.2 MIXED HYPERLIPIDEMIA: Primary | ICD-10-CM

## 2021-11-10 DIAGNOSIS — M81.0 OSTEOPOROSIS, UNSPECIFIED OSTEOPOROSIS TYPE, UNSPECIFIED PATHOLOGICAL FRACTURE PRESENCE: ICD-10-CM

## 2021-11-10 DIAGNOSIS — Z13.39 SCREENING FOR ALCOHOLISM: ICD-10-CM

## 2021-11-10 DIAGNOSIS — Z13.31 SCREENING FOR DEPRESSION: ICD-10-CM

## 2021-11-10 DIAGNOSIS — I10 ESSENTIAL HYPERTENSION: ICD-10-CM

## 2021-11-10 PROCEDURE — G0444 DEPRESSION SCREEN ANNUAL: HCPCS | Performed by: FAMILY MEDICINE

## 2021-11-10 PROCEDURE — 3017F COLORECTAL CA SCREEN DOC REV: CPT | Performed by: FAMILY MEDICINE

## 2021-11-10 PROCEDURE — 1090F PRES/ABSN URINE INCON ASSESS: CPT | Performed by: FAMILY MEDICINE

## 2021-11-10 PROCEDURE — G8510 SCR DEP NEG, NO PLAN REQD: HCPCS | Performed by: FAMILY MEDICINE

## 2021-11-10 PROCEDURE — G8754 DIAS BP LESS 90: HCPCS | Performed by: FAMILY MEDICINE

## 2021-11-10 PROCEDURE — G8427 DOCREV CUR MEDS BY ELIG CLIN: HCPCS | Performed by: FAMILY MEDICINE

## 2021-11-10 PROCEDURE — G8536 NO DOC ELDER MAL SCRN: HCPCS | Performed by: FAMILY MEDICINE

## 2021-11-10 PROCEDURE — 1101F PT FALLS ASSESS-DOCD LE1/YR: CPT | Performed by: FAMILY MEDICINE

## 2021-11-10 PROCEDURE — 71046 X-RAY EXAM CHEST 2 VIEWS: CPT

## 2021-11-10 PROCEDURE — G8753 SYS BP > OR = 140: HCPCS | Performed by: FAMILY MEDICINE

## 2021-11-10 PROCEDURE — G8420 CALC BMI NORM PARAMETERS: HCPCS | Performed by: FAMILY MEDICINE

## 2021-11-10 PROCEDURE — G9899 SCRN MAM PERF RSLTS DOC: HCPCS | Performed by: FAMILY MEDICINE

## 2021-11-10 PROCEDURE — G0442 ANNUAL ALCOHOL SCREEN 15 MIN: HCPCS | Performed by: FAMILY MEDICINE

## 2021-11-10 PROCEDURE — 99213 OFFICE O/P EST LOW 20 MIN: CPT | Performed by: FAMILY MEDICINE

## 2021-11-10 PROCEDURE — G0439 PPPS, SUBSEQ VISIT: HCPCS | Performed by: FAMILY MEDICINE

## 2021-11-10 NOTE — PATIENT INSTRUCTIONS
Medicare Wellness Visit, Female     The best way to live healthy is to have a lifestyle where you eat a well-balanced diet, exercise regularly, limit alcohol use, and quit all forms of tobacco/nicotine, if applicable. Regular preventive services are another way to keep healthy. Preventive services (vaccines, screening tests, monitoring & exams) can help personalize your care plan, which helps you manage your own care. Screening tests can find health problems at the earliest stages, when they are easiest to treat. [facility] follows the current, evidence-based guidelines published by the [facility] ([facility]) when recommending preventive services for our patients. Because we follow these guidelines, sometimes recommendations change over time as research supports it. (For example, mammograms used to be recommended annually. Even though Medicare will still pay for an annual mammogram, the newer guidelines recommend a mammogram every two years for women of average risk). Of course, you and your doctor may decide to screen more often for some diseases, based on your risk and your co-morbidities (chronic disease you are already diagnosed with). Preventive services for you include:  - Medicare offers their members a free annual wellness visit, which is time for you and your primary care provider to discuss and plan for your preventive service needs. Take advantage of this benefit every year!  -All adults over the age of 72 should receive the recommended pneumonia vaccines. Current USPSTF guidelines recommend a series of two vaccines for the best pneumonia protection.   -All adults should have a flu vaccine yearly and a tetanus vaccine every 10 years.   -All adults age 48 and older should receive the shingles vaccines (series of two vaccines).       -All adults age 38-68 who are overweight should have a diabetes screening test once every three years.   -All adults born between 80 and 1965 should be screened once for Hepatitis C.  -Other screening tests and preventive services for persons with diabetes include: an eye exam to screen for diabetic retinopathy, a kidney function test, a foot exam, and stricter control over your cholesterol.   -Cardiovascular screening for adults with routine risk involves an electrocardiogram (ECG) at intervals determined by your doctor.   -Colorectal cancer screenings should be done for adults age 54-65 with no increased risk factors for colorectal cancer. There are a number of acceptable methods of screening for this type of cancer. Each test has its own benefits and drawbacks. Discuss with your doctor what is most appropriate for you during your annual wellness visit. The different tests include: colonoscopy (considered the best screening method), a fecal occult blood test, a fecal DNA test, and sigmoidoscopy.    -A bone mass density test is recommended when a woman turns 65 to screen for osteoporosis. This test is only recommended one time, as a screening. Some providers will use this same test as a disease monitoring tool if you already have osteoporosis. -Breast cancer screenings are recommended every other year for women of normal risk, age 54-69.  -Cervical cancer screenings for women over age 72 are only recommended with certain risk factors.      Here is a list of your current Health Maintenance items (your personalized list of preventive services) with a due date:  Health Maintenance Due   Topic Date Due    Hepatitis C Test  Never done    DTaP/Tdap/Td  (1 - Tdap) Never done    Colorectal Screening  Never done    Shingles Vaccine (1 of 2) Never done    Smoker or Former Smoker - Annual Lung Cancer Screen  Never done    Pneumococcal Vaccine (1 of 1 - PPSV23) Never done    Cholesterol Test   11/17/2021         Medicare Wellness Visit, Female     The best way to live healthy is to have a lifestyle where you eat a well-balanced diet, exercise regularly, limit alcohol use, and quit all forms of tobacco/nicotine, if applicable. Regular preventive services are another way to keep healthy. Preventive services (vaccines, screening tests, monitoring & exams) can help personalize your care plan, which helps you manage your own care. Screening tests can find health problems at the earliest stages, when they are easiest to treat. [facility] follows the current, evidence-based guidelines published by the [facility] ([facility]) when recommending preventive services for our patients. Because we follow these guidelines, sometimes recommendations change over time as research supports it. (For example, mammograms used to be recommended annually. Even though Medicare will still pay for an annual mammogram, the newer guidelines recommend a mammogram every two years for women of average risk). Of course, you and your doctor may decide to screen more often for some diseases, based on your risk and your co-morbidities (chronic disease you are already diagnosed with). Preventive services for you include:  - Medicare offers their members a free annual wellness visit, which is time for you and your primary care provider to discuss and plan for your preventive service needs. Take advantage of this benefit every year!  -All adults over the age of 72 should receive the recommended pneumonia vaccines. Current USPSTF guidelines recommend a series of two vaccines for the best pneumonia protection.   -All adults should have a flu vaccine yearly and a tetanus vaccine every 10 years.   -All adults age 48 and older should receive the shingles vaccines (series of two vaccines).       -All adults age 38-68 who are overweight should have a diabetes screening test once every three years.   -All adults born between 80 and 1965 should be screened once for Hepatitis C.  -Other screening tests and preventive services for persons with diabetes include: an eye exam to screen for diabetic retinopathy, a kidney function test, a foot exam, and stricter control over your cholesterol.   -Cardiovascular screening for adults with routine risk involves an electrocardiogram (ECG) at intervals determined by your doctor.   -Colorectal cancer screenings should be done for adults age 54-65 with no increased risk factors for colorectal cancer. There are a number of acceptable methods of screening for this type of cancer. Each test has its own benefits and drawbacks. Discuss with your doctor what is most appropriate for you during your annual wellness visit. The different tests include: colonoscopy (considered the best screening method), a fecal occult blood test, a fecal DNA test, and sigmoidoscopy.    -A bone mass density test is recommended when a woman turns 65 to screen for osteoporosis. This test is only recommended one time, as a screening. Some providers will use this same test as a disease monitoring tool if you already have osteoporosis. -Breast cancer screenings are recommended every other year for women of normal risk, age 54-69.  -Cervical cancer screenings for women over age 72 are only recommended with certain risk factors.      Here is a list of your current Health Maintenance items (your personalized list of preventive services) with a due date:  Health Maintenance Due   Topic Date Due    Hepatitis C Test  Never done    DTaP/Tdap/Td  (1 - Tdap) Never done    Colorectal Screening  Never done    Shingles Vaccine (1 of 2) Never done    Smoker or Former Smoker - Annual Lung Cancer Screen  Never done    Pneumococcal Vaccine (1 of 1 - PPSV23) Never done    Cholesterol Test   11/17/2021

## 2021-11-10 NOTE — PROGRESS NOTES
IDENTIFYING INFORMATION:      Mati Badillo. [place] , 80 y.o., female  [address]  [place]     Medical Record Number: [id number]    E and M CODING:  Established patient      Patient Active Problem List   Diagnosis Code    Hypertension I10    Hyperlipidemia E78.5    Osteoporosis M81.0           CHIEF COMPLAINT:   Chief Complaint   Patient presents with    Annual Wellness Visit         HISTORY OF PRESENT ILLNESS:    Joseph Sheikh is a 80 y.o. female . she comes in for Follow up and has some sinsus drainage, congestion, mild, Thinks it is time of year. Has had the  Electricity at house go out due to corrosion of electrical box. Been staying with family for now until it gets corrected. She does have a little bit of cough that is a little more so than usual.  However, she thinks it is related to the sinus drainage. Denies any fever, chills, sweats. Does need colonoscopy, didn't get due to COVID-19  DID get covid 9 vaccines  diD GET SEASONAL INFLUENZA  Needs mammo  Needs lab  Needs LDCT        PAST MEDICAL HISTORY:     Past Medical History:   Diagnosis Date    Hyperlipidemia     Hypertension     Neuropathy     bilateral feet     Osteoporosis        MEDICATIONS:     Current Outpatient Medications on File Prior to Visit   Medication Sig Dispense Refill    telmisartan-hydroCHLOROthiazide (MICARDIS HCT) 40-12.5 mg per tablet Take 1 Tablet by mouth daily. 90 Tablet 1    atorvastatin (LIPITOR) 20 mg tablet Take 1 Tablet by mouth daily. 90 Tablet 1    fexofenadine (ALLEGRA) 180 mg tablet Take 1 Tablet by mouth daily. 90 Tablet 1    albuterol (PROVENTIL HFA, VENTOLIN HFA, PROAIR HFA) 90 mcg/actuation inhaler Take 2 Puffs by inhalation every four (4) hours as needed for Wheezing. 1 Inhaler 1    vitamin e (E GEMS) 1,000 unit capsule Take 1,000 Units by mouth daily.  aspirin (ASPIR-81 PO) Take  by mouth.  ascorbic acid (VITAMIN C PO) Take  by mouth.       multivitamin (ONE A DAY) tablet Take 1 Tab by mouth daily.  [DISCONTINUED] methylPREDNISolone (MEDROL DOSEPACK) 4 mg tablet Take one dose pack orally as directed until gone (Patient not taking: Reported on 11/10/2021) 1 Dose Pack 0     No current facility-administered medications on file prior to visit. ALLERGIES:    No Known Allergies      SOCIAL HISTORY:     Social History     Tobacco Use    Smoking status: Former Smoker     Packs/day: 1.50     Years: 50.00     Pack years: 75.00     Types: Cigarettes     Quit date: 2020     Years since quittin.1    Smokeless tobacco: Never Used   Vaping Use    Vaping Use: Never used   Substance Use Topics    Alcohol use: Yes     Comment: special occassions     Drug use: Never       SURGICAL HISTORY:    Past Surgical History:   Procedure Laterality Date    HX COLONOSCOPY      due  but due to COVID-19 will wait    HX HEENT      open up duct in eye        FAMILY HISTORY:    Family History   Problem Relation Age of Onset    Dementia Mother     Heart Disease Father         valve problem     Cancer Brother         Leukemia     Diabetes Brother     Heart Attack Brother         x3    Stroke Brother         x3         REVIEW OF SYSTEMS:    I personally collected this information from all available source present (patient/others in room and records available) -[email or web address]  Review of Systems   Constitutional: Negative for chills, diaphoresis and fever. HENT: Positive for congestion, sinus pain and tinnitus. Negative for sore throat. Eyes: Negative for blurred vision, double vision and photophobia. Respiratory: Negative for cough, sputum production, shortness of breath and wheezing. Cardiovascular: Negative for chest pain, palpitations, orthopnea, leg swelling and PND. Gastrointestinal: Negative for abdominal pain, constipation, diarrhea, heartburn, nausea and vomiting. Genitourinary: Positive for frequency. Negative for dysuria and urgency.    Neurological: Positive for tingling and sensory change. Negative for dizziness and headaches. PHYSICAL EXAMINATION:    Vital Signs:    Visit Vitals  BP (!) 155/81 (BP 1 Location: Right upper arm, BP Patient Position: Sitting)   Pulse 81   Temp 97.7 °F (36.5 °C) (Temporal)   Ht 5' 4.5\" (1.638 m)   Wt 122 lb 6.4 oz (55.5 kg)   SpO2 95%   BMI 20.69 kg/m²         Wt Readings from Last 3 Encounters:   11/10/21 122 lb 6.4 oz (55.5 kg)   06/16/21 123 lb 12.8 oz (56.2 kg)   05/10/21 125 lb (56.7 kg)     BP Readings from Last 3 Encounters:   11/10/21 (!) 155/81   06/16/21 139/83   05/10/21 (!) 144/82         Physical Exam  Nursing note reviewed. Constitutional:       General: She is not in acute distress. Appearance: Normal appearance. She is not ill-appearing or toxic-appearing. HENT:      Right Ear: Tympanic membrane, ear canal and external ear normal.      Left Ear: Tympanic membrane, ear canal and external ear normal.      Nose: Congestion present. No rhinorrhea. Mouth/Throat:      Pharynx: Posterior oropharyngeal erythema present. No oropharyngeal exudate. Comments: Nares are slightly boggy. There is scant PND in the posterior pharynx which is hyperemic. Eyes:      General: No scleral icterus. Extraocular Movements: Extraocular movements intact. Conjunctiva/sclera: Conjunctivae normal.      Pupils: Pupils are equal, round, and reactive to light. Neck:      Vascular: No carotid bruit. Cardiovascular:      Rate and Rhythm: Normal rate and regular rhythm. Heart sounds: No murmur heard. No friction rub. No gallop. Pulmonary:      Effort: Pulmonary effort is normal.      Breath sounds: Normal breath sounds. No wheezing, rhonchi or rales. Abdominal:      General: Bowel sounds are normal. There is no distension. Palpations: Abdomen is soft. There is no mass. Tenderness: There is no abdominal tenderness. Musculoskeletal:         General: No swelling. Cervical back: No rigidity.  No muscular tenderness. Right lower leg: No edema. Left lower leg: No edema. Comments: Varicosities  NO clubbing , cyanosis, chords or tenderness   Lymphadenopathy:      Cervical: No cervical adenopathy. Skin:     Coloration: Skin is not jaundiced. Findings: No erythema or rash. Neurological:      General: No focal deficit present. Mental Status: She is alert and oriented to person, place, and time. Mental status is at baseline. Comments: Onto and off of the exam table without assistance   Psychiatric:         Mood and Affect: Mood normal.         Behavior: Behavior normal.         Thought Content: Thought content normal.         Judgment: Judgment normal.       Three Word Registration and RECALL:      3/3  Clock Drawin/2  Total:             ASSESSMENT/PLAN:      ANNUAL WELLNESS VISIT PERFORMED:     Nursing staff wellness visit note reviewed. Advanced directives were discussed with the patient and information was given if appropriate. Tobacco use, alcohol screening, weight and body mass index, level of physical activity, nutrition, fall risk screenings were done. We also reviewed and discussed vaccinations. Those were ordered if indicated and patient requested. We discussed mammography, Pap smear and pelvic exam as well as bone density testing. Those were ordered if indicated. We discussed colon cancer screening, eye exam, depression screening, mental status/cognition and pain levels. Those items addressed with the patient were ordered this visit if indicated. Mentation is in medical nursing staff note and my office visit. Reviewed all information as noted.  Lab work ordered.  Chest x-ray ordered. She will consider the low-dose CT scan   Mammogram has been ordered.  Colonoscopy is due. She was asked to consider getting this. The risk of missing colon cancer which could be early and treatable stage was explained.    Alcohol screen was negative, less than 3 minutes spent.  Depression screen negative, less than 3 minutes spent.  Follow-up and treat as indicated. ICD-10-CM ICD-9-CM    1. Mixed hyperlipidemia  E78.2 272.2 CBC WITH AUTOMATED DIFF      METABOLIC PANEL, COMPREHENSIVE      LIPID PANEL      TSH 3RD GENERATION      URINALYSIS W/ RFLX MICROSCOPIC   2. Medicare annual wellness visit, subsequent  Z00.00 V70.0    3. Screening for alcoholism  Z13.39 V79.1 VT ANNUAL ALCOHOL SCREEN 15 MIN   4. Screening for depression  Z13.31 V79.0 DEPRESSION SCREEN ANNUAL   5. Essential hypertension  I10 401.9 CBC WITH AUTOMATED DIFF      METABOLIC PANEL, COMPREHENSIVE      LIPID PANEL      TSH 3RD GENERATION      URINALYSIS W/ RFLX MICROSCOPIC   6. Osteoporosis, unspecified osteoporosis type, unspecified pathological fracture presence  M81.0 733.00    7. Screening mammogram, encounter for  Z12.31 V76.12 TAMMIE MAMMO BI SCREENING INCL CAD   8. Primary hypertension  I10 401.9 XR CHEST PA LAT   9. Neuropathy  G62.9 355.9 REFERRAL TO NEUROLOGY     Discussion (regarding today's visit with Jonathan Jarquin);   WE gave the patient a review of medications, treatment, testing such as labs, imagine, referrals and when to call regarding results and appointments.  Reminded patient to keep any and all appointments with specialists, labs, imaging.  Reminded patient to make sure we get copies of any specialists care, labs and imaging.  Reminded patient to call of come by the office if there are any concerns, questions , comments or problems.  The patient verbalized understanding of the care plan and all questions were answered to the patient's satisfaction prior to leaving the office.  The patient was told that failure to comply with recommended testing could result in abnormal health consequences.  The patient was instructed to have yearly routine health maintenance including but not limited to age appropriate vaccines, testing, screening exams.    ALL questions were answered to her satisfaction before leaving the office. The patient actively participated in medical decision making. This date I spent  20 minutes reviewing chart, previous notes, tests and interviewing and examining patients answering questions providing follow up as well as ordering tests. FOLLOW UP:     Patient knows to keep any and all future visits scheduled unless told otherwise. Patient knows to call, come back if any concerns, questions, comments or problems arise. Follow-up and Dispositions    · Return in about 6 months (around 5/10/2022) for Follow up bp, lipids. Signed By: Ping Tavarez DO     November 10, 2021     TIME: 6:15 pm    This visit was reviewed and signed electronically. It was been completed with voice recognition software and hand typing. It may have syntax and spelling errors despite editing.

## 2021-11-10 NOTE — PROGRESS NOTES
This is the Subsequent Medicare Annual Wellness Exam, performed 12 months or more after the Initial AWV or the last Subsequent AWV    I have reviewed the patient's medical history in detail and updated the computerized patient record. Assessment/Plan   Education and counseling provided:  Are appropriate based on today's review and evaluation    1. Medicare annual wellness visit, subsequent  2. Screening for alcoholism  -     [place] ANNUAL ALCOHOL SCREEN 15 MIN  3. Screening for depression  -     DEPRESSION SCREEN ANNUAL       Depression Risk Factor Screening     3 most recent PHQ Screens 11/10/2021   Little interest or pleasure in doing things Not at all   Feeling down, depressed, irritable, or hopeless Several days   Total Score PHQ 2 1       Alcohol Risk Screen    Do you average more than 1 drink per night or more than 7 drinks a week:  No    On any one occasion in the past three months have you have had more than 3 drinks containing alcohol:  No        Functional Ability and Level of Safety    Hearing: Yes, but thinks it due to wax build up      Activities of Daily Living: The home contains: no safety equipment. Patient does total self care      Ambulation: with no difficulty     Fall Risk:  Fall Risk Assessment, last 12 mths 11/10/2021   Able to walk? Yes   Fall in past 12 months? 0   Do you feel unsteady?  0   Are you worried about falling 0      Abuse Screen:  Patient is not abused       Cognitive Screening    Has your family/caregiver stated any concerns about your memory: no     Cognitive Screening: Normal - MMSE (Mini Mental Status Exam), Clock Drawing Test    Health Maintenance Due     Health Maintenance Due   Topic Date Due    Hepatitis C Screening  Never done    DTaP/Tdap/Td series (1 - Tdap) Never done    Colorectal Cancer Screening Combo  Never done    Shingrix Vaccine Age 50> (1 of 2) Never done    Low dose CT lung screening  Never done    Pneumococcal 65+ years (1 of 1 - PPSV23) Never done  Lipid Screen  11/17/2021       Patient Care Team   Patient Care Team:  Edie Hull DO as PCP - General (Family Medicine)  Edie Hull DO as PCP [facility]    History     Patient Active Problem List   Diagnosis Code    Hypertension I10    Hyperlipidemia E78.5    Osteoporosis M81.0     Past Medical History:   Diagnosis Date    Hyperlipidemia     Hypertension     Neuropathy     bilateral feet     Osteoporosis       Past Surgical History:   Procedure Laterality Date    HX COLONOSCOPY      due 2019 but due to COVID-19 will wait    HX HEENT      open up duct in eye     Current Outpatient Medications   Medication Sig Dispense Refill    telmisartan-hydroCHLOROthiazide (MICARDIS HCT) 40-12.5 mg per tablet Take 1 Tablet by mouth daily. 90 Tablet 1    atorvastatin (LIPITOR) 20 mg tablet Take 1 Tablet by mouth daily. 90 Tablet 1    fexofenadine (ALLEGRA) 180 mg tablet Take 1 Tablet by mouth daily. 90 Tablet 1    albuterol (PROVENTIL HFA, VENTOLIN HFA, PROAIR HFA) 90 mcg/actuation inhaler Take 2 Puffs by inhalation every four (4) hours as needed for Wheezing. 1 Inhaler 1    vitamin e (E GEMS) 1,000 unit capsule Take 1,000 Units by mouth daily.  aspirin (ASPIR-81 PO) Take  by mouth.  ascorbic acid (VITAMIN C PO) Take  by mouth.  multivitamin (ONE A DAY) tablet Take 1 Tab by mouth daily.       methylPREDNISolone (MEDROL DOSEPACK) 4 mg tablet Take one dose pack orally as directed until gone (Patient not taking: Reported on 11/10/2021) 1 Dose Pack 0     No Known Allergies    Family History   Problem Relation Age of Onset    Dementia Mother     Heart Disease Father         valve problem     Cancer Brother         Leukemia     Diabetes Brother     Heart Attack Brother         x3    Stroke Brother         x3     Social History     Tobacco Use    Smoking status: Former Smoker     Packs/day: 1.50     Years: 50.00     Pack years: 75.00     Types: Cigarettes     Quit date: 09/2020 Years since quittin.1    Smokeless tobacco: Never Used   Substance Use Topics    Alcohol use: Yes     Comment: special occassions          Juan Jose Lynch CMA

## 2021-11-13 LAB
ALBUMIN SERPL-MCNC: 4.4 G/DL (ref 3.8–4.8)
ALBUMIN/GLOB SERPL: 2.2 {RATIO} (ref 1.2–2.2)
ALP SERPL-CCNC: 64 IU/L (ref 44–121)
ALT SERPL-CCNC: 15 IU/L (ref 0–32)
APPEARANCE UR: CLEAR
AST SERPL-CCNC: 18 IU/L (ref 0–40)
BASOPHILS # BLD AUTO: 0 X10E3/UL (ref 0–0.2)
BASOPHILS NFR BLD AUTO: 1 %
BILIRUB SERPL-MCNC: 0.4 MG/DL (ref 0–1.2)
BILIRUB UR QL STRIP: NEGATIVE
BUN SERPL-MCNC: 8 MG/DL (ref 8–27)
BUN/CREAT SERPL: 13 (ref 12–28)
CALCIUM SERPL-MCNC: 9.4 MG/DL (ref 8.7–10.3)
CHLORIDE SERPL-SCNC: 92 MMOL/L (ref 96–106)
CHOLEST SERPL-MCNC: 181 MG/DL (ref 100–199)
CO2 SERPL-SCNC: 24 MMOL/L (ref 20–29)
COLOR UR: YELLOW
CREAT SERPL-MCNC: 0.64 MG/DL (ref 0.57–1)
EOSINOPHIL # BLD AUTO: 0.2 X10E3/UL (ref 0–0.4)
EOSINOPHIL NFR BLD AUTO: 2 %
ERYTHROCYTE [DISTWIDTH] IN BLOOD BY AUTOMATED COUNT: 11.6 % (ref 11.7–15.4)
GLOBULIN SER CALC-MCNC: 2 G/DL (ref 1.5–4.5)
GLUCOSE SERPL-MCNC: 94 MG/DL (ref 65–99)
GLUCOSE UR QL: NEGATIVE
HCT VFR BLD AUTO: 35.7 % (ref 34–46.6)
HDLC SERPL-MCNC: 80 MG/DL
HGB BLD-MCNC: 12.1 G/DL (ref 11.1–15.9)
HGB UR QL STRIP: NEGATIVE
IMM GRANULOCYTES # BLD AUTO: 0 X10E3/UL (ref 0–0.1)
IMM GRANULOCYTES NFR BLD AUTO: 0 %
KETONES UR QL STRIP: NEGATIVE
LDLC SERPL CALC-MCNC: 90 MG/DL (ref 0–99)
LEUKOCYTE ESTERASE UR QL STRIP: NEGATIVE
LYMPHOCYTES # BLD AUTO: 2 X10E3/UL (ref 0.7–3.1)
LYMPHOCYTES NFR BLD AUTO: 26 %
MCH RBC QN AUTO: 31.3 PG (ref 26.6–33)
MCHC RBC AUTO-ENTMCNC: 33.9 G/DL (ref 31.5–35.7)
MCV RBC AUTO: 93 FL (ref 79–97)
MICRO URNS: ABNORMAL
MONOCYTES # BLD AUTO: 0.6 X10E3/UL (ref 0.1–0.9)
MONOCYTES NFR BLD AUTO: 8 %
NEUTROPHILS # BLD AUTO: 4.9 X10E3/UL (ref 1.4–7)
NEUTROPHILS NFR BLD AUTO: 63 %
NITRITE UR QL STRIP: NEGATIVE
PH UR STRIP: 8 [PH] (ref 5–7.5)
PLATELET # BLD AUTO: 387 X10E3/UL (ref 150–450)
POTASSIUM SERPL-SCNC: 4.7 MMOL/L (ref 3.5–5.2)
PROT SERPL-MCNC: 6.4 G/DL (ref 6–8.5)
PROT UR QL STRIP: NEGATIVE
RBC # BLD AUTO: 3.86 X10E6/UL (ref 3.77–5.28)
SODIUM SERPL-SCNC: 130 MMOL/L (ref 134–144)
SP GR UR: 1.01 (ref 1–1.03)
TRIGL SERPL-MCNC: 54 MG/DL (ref 0–149)
TSH SERPL DL<=0.005 MIU/L-ACNC: 0.9 UIU/ML (ref 0.45–4.5)
UROBILINOGEN UR STRIP-MCNC: 0.2 MG/DL (ref 0.2–1)
VLDLC SERPL CALC-MCNC: 11 MG/DL (ref 5–40)
WBC # BLD AUTO: 7.7 X10E3/UL (ref 3.4–10.8)

## 2021-11-17 NOTE — PROGRESS NOTES
Blood count is normal without anemia or infection. Liver and kidneys are normal.  Sodium is a little at 130. However, just continue to eat a regular diet. Urinalysis is normal.  Cholesterol is actually great. Continue medicine.   Thyroid is normal.  Urinalysis is normal.

## 2021-12-08 ENCOUNTER — HOSPITAL ENCOUNTER (OUTPATIENT)
Dept: MAMMOGRAPHY | Age: 71
Discharge: HOME | End: 2021-12-08
Attending: FAMILY MEDICINE
Payer: MEDICARE

## 2021-12-08 DIAGNOSIS — Z12.31 SCREENING MAMMOGRAM, ENCOUNTER FOR: ICD-10-CM

## 2021-12-08 PROCEDURE — 77063 BREAST TOMOSYNTHESIS BI: CPT

## 2021-12-29 ENCOUNTER — APPOINTMENT (OUTPATIENT)
Dept: CT IMAGING | Age: 71
End: 2021-12-29
Attending: EMERGENCY MEDICINE
Payer: MEDICARE

## 2021-12-29 ENCOUNTER — HOSPITAL ENCOUNTER (OUTPATIENT)
Age: 71
Setting detail: OBSERVATION
Discharge: HOME | End: 2021-12-30
Attending: EMERGENCY MEDICINE | Admitting: INTERNAL MEDICINE
Payer: MEDICARE

## 2021-12-29 DIAGNOSIS — G45.9 TIA (TRANSIENT ISCHEMIC ATTACK): Primary | ICD-10-CM

## 2021-12-29 LAB
ALBUMIN SERPL-MCNC: 3.9 G/DL (ref 3.5–5)
ALBUMIN/GLOB SERPL: 1.2 {RATIO} (ref 1.1–2.2)
ALP SERPL-CCNC: 79 U/L (ref 45–117)
ALT SERPL-CCNC: 21 U/L (ref 12–78)
ANION GAP SERPL CALC-SCNC: 5 MMOL/L (ref 5–15)
APTT PPP: 32.3 SEC (ref 21.2–34.1)
AST SERPL W P-5'-P-CCNC: 17 U/L (ref 15–37)
BASOPHILS # BLD: 0 K/UL (ref 0–0.1)
BASOPHILS NFR BLD: 0 % (ref 0–1)
BILIRUB SERPL-MCNC: 0.2 MG/DL (ref 0.2–1)
BUN SERPL-MCNC: 11 MG/DL (ref 6–20)
BUN/CREAT SERPL: 16 (ref 12–20)
CA-I BLD-MCNC: 9.1 MG/DL (ref 8.5–10.1)
CHLORIDE SERPL-SCNC: 97 MMOL/L (ref 97–108)
CO2 SERPL-SCNC: 28 MMOL/L (ref 21–32)
CREAT SERPL-MCNC: 0.67 MG/DL (ref 0.55–1.02)
DIFFERENTIAL METHOD BLD: ABNORMAL
EOSINOPHIL # BLD: 0.1 K/UL (ref 0–0.4)
EOSINOPHIL NFR BLD: 1 % (ref 0–7)
ERYTHROCYTE [DISTWIDTH] IN BLOOD BY AUTOMATED COUNT: 12.7 % (ref 11.5–14.5)
GLOBULIN SER CALC-MCNC: 3.2 G/DL (ref 2–4)
GLUCOSE BLD STRIP.AUTO-MCNC: 130 MG/DL (ref 65–117)
GLUCOSE SERPL-MCNC: 119 MG/DL (ref 65–100)
HCT VFR BLD AUTO: 36 % (ref 35–47)
HGB BLD-MCNC: 12.4 G/DL (ref 11.5–16)
IMM GRANULOCYTES # BLD AUTO: 0.1 K/UL (ref 0–0.04)
IMM GRANULOCYTES NFR BLD AUTO: 1 % (ref 0–0.5)
INR PPP: 1.1 (ref 0.9–1.1)
LYMPHOCYTES # BLD: 2.4 K/UL (ref 0.8–3.5)
LYMPHOCYTES NFR BLD: 22 % (ref 12–49)
MCH RBC QN AUTO: 31.2 PG (ref 26–34)
MCHC RBC AUTO-ENTMCNC: 34.4 G/DL (ref 30–36.5)
MCV RBC AUTO: 90.5 FL (ref 80–99)
MONOCYTES # BLD: 0.8 K/UL (ref 0–1)
MONOCYTES NFR BLD: 7 % (ref 5–13)
NEUTS SEG # BLD: 7.6 K/UL (ref 1.8–8)
NEUTS SEG NFR BLD: 69 % (ref 32–75)
NRBC # BLD: 0 K/UL (ref 0–0.01)
NRBC BLD-RTO: 0 PER 100 WBC
PERFORMED BY, TECHID: ABNORMAL
PLATELET # BLD AUTO: 409 K/UL (ref 150–400)
PMV BLD AUTO: 9.5 FL (ref 8.9–12.9)
POTASSIUM SERPL-SCNC: 3.9 MMOL/L (ref 3.5–5.1)
PROT SERPL-MCNC: 7.1 G/DL (ref 6.4–8.2)
PROTHROMBIN TIME: 13.7 SEC (ref 11.9–14.7)
RBC # BLD AUTO: 3.98 M/UL (ref 3.8–5.2)
SODIUM SERPL-SCNC: 130 MMOL/L (ref 136–145)
THERAPEUTIC RANGE,PTTT: NORMAL SEC (ref 82–109)
TROPONIN-HIGH SENSITIVITY: 6 NG/L (ref 0–51)
WBC # BLD AUTO: 10.9 K/UL (ref 3.6–11)

## 2021-12-29 PROCEDURE — 80053 COMPREHEN METABOLIC PANEL: CPT

## 2021-12-29 PROCEDURE — 70450 CT HEAD/BRAIN W/O DYE: CPT

## 2021-12-29 PROCEDURE — 85610 PROTHROMBIN TIME: CPT

## 2021-12-29 PROCEDURE — 36415 COLL VENOUS BLD VENIPUNCTURE: CPT

## 2021-12-29 PROCEDURE — 82962 GLUCOSE BLOOD TEST: CPT

## 2021-12-29 PROCEDURE — 84484 ASSAY OF TROPONIN QUANT: CPT

## 2021-12-29 PROCEDURE — 85730 THROMBOPLASTIN TIME PARTIAL: CPT

## 2021-12-29 PROCEDURE — 96372 THER/PROPH/DIAG INJ SC/IM: CPT

## 2021-12-29 PROCEDURE — 65270000029 HC RM PRIVATE

## 2021-12-29 PROCEDURE — 99285 EMERGENCY DEPT VISIT HI MDM: CPT

## 2021-12-29 PROCEDURE — 85025 COMPLETE CBC W/AUTO DIFF WBC: CPT

## 2021-12-29 RX ORDER — ONDANSETRON 2 MG/ML
4 INJECTION INTRAMUSCULAR; INTRAVENOUS
Status: DISCONTINUED | OUTPATIENT
Start: 2021-12-29 | End: 2021-12-30 | Stop reason: HOSPADM

## 2021-12-29 RX ORDER — SODIUM CHLORIDE 0.9 % (FLUSH) 0.9 %
5-40 SYRINGE (ML) INJECTION EVERY 8 HOURS
Status: DISCONTINUED | OUTPATIENT
Start: 2021-12-29 | End: 2021-12-30 | Stop reason: HOSPADM

## 2021-12-29 RX ORDER — ATORVASTATIN CALCIUM 20 MG/1
20 TABLET, FILM COATED ORAL DAILY
Status: DISCONTINUED | OUTPATIENT
Start: 2021-12-30 | End: 2021-12-30

## 2021-12-29 RX ORDER — ONDANSETRON 4 MG/1
4 TABLET, ORALLY DISINTEGRATING ORAL
Status: DISCONTINUED | OUTPATIENT
Start: 2021-12-29 | End: 2021-12-30 | Stop reason: HOSPADM

## 2021-12-29 RX ORDER — ACETAMINOPHEN 650 MG/1
650 SUPPOSITORY RECTAL
Status: DISCONTINUED | OUTPATIENT
Start: 2021-12-29 | End: 2021-12-30 | Stop reason: HOSPADM

## 2021-12-29 RX ORDER — SODIUM CHLORIDE 0.9 % (FLUSH) 0.9 %
5-40 SYRINGE (ML) INJECTION AS NEEDED
Status: DISCONTINUED | OUTPATIENT
Start: 2021-12-29 | End: 2021-12-30 | Stop reason: HOSPADM

## 2021-12-29 RX ORDER — POLYETHYLENE GLYCOL 3350 17 G/17G
17 POWDER, FOR SOLUTION ORAL DAILY PRN
Status: DISCONTINUED | OUTPATIENT
Start: 2021-12-29 | End: 2021-12-30 | Stop reason: HOSPADM

## 2021-12-29 RX ORDER — ACETAMINOPHEN 325 MG/1
650 TABLET ORAL
Status: DISCONTINUED | OUTPATIENT
Start: 2021-12-29 | End: 2021-12-30 | Stop reason: HOSPADM

## 2021-12-29 RX ORDER — ENOXAPARIN SODIUM 100 MG/ML
40 INJECTION SUBCUTANEOUS DAILY
Status: DISCONTINUED | OUTPATIENT
Start: 2021-12-30 | End: 2021-12-30 | Stop reason: HOSPADM

## 2021-12-29 RX ORDER — VITAMIN E CAP 100 UNIT 100 UNIT
1000 CAP ORAL DAILY
Status: DISCONTINUED | OUTPATIENT
Start: 2021-12-30 | End: 2021-12-30 | Stop reason: HOSPADM

## 2021-12-29 RX ORDER — LATANOPROST 50 UG/ML
1 SOLUTION/ DROPS OPHTHALMIC
COMMUNITY

## 2021-12-29 RX ORDER — MULTIVITAMIN
1 TABLET ORAL DAILY
Status: DISCONTINUED | OUTPATIENT
Start: 2021-12-30 | End: 2021-12-30 | Stop reason: HOSPADM

## 2021-12-29 RX ORDER — LATANOPROST 50 UG/ML
1 SOLUTION/ DROPS OPHTHALMIC
Status: DISCONTINUED | OUTPATIENT
Start: 2021-12-29 | End: 2021-12-30 | Stop reason: HOSPADM

## 2021-12-29 RX ADMIN — Medication 10 ML: at 23:41

## 2021-12-29 NOTE — ED TRIAGE NOTES
Pt reports dizziness after standing, reports left arm numbness and left sided facial tingling, all symptoms have improved at this time except lip numbess, episode lasted 10-15 minutes [id number] stroke alert call in triage

## 2021-12-29 NOTE — ED PROVIDER NOTES
EMERGENCY DEPARTMENT HISTORY AND PHYSICAL EXAM      Date: 12/29/2021  Patient Name: Jonathan Jarquin      History of Presenting Illness     Chief Complaint   Patient presents with    Extremity Weakness    Dizziness       History Provided By: Patient    HPI: Jonathan Jarquin, 80 y.o. female with a past medical history significant Prior TIA presents to the ED with acute onset of numbness in her left hand as well as over her left lip. As well as some weakness on the left side of her body with regard to getting up. Upon arrival to the emergency room all of her symptoms had resolved. She has not had these type of symptoms before with her last TIA. She denies any exacerbating or relieving factors and has not treated with anything. She additionally denies any fever, chills, nausea, vomiting, chest pain or shortness of breath, S, diarrhea, headache minutes with weight loss. There are no other complaints, changes, or physical findings at this time. PCP: Cathy Cintron, DO    Current Outpatient Medications   Medication Sig Dispense Refill    telmisartan-hydroCHLOROthiazide (MICARDIS HCT) 40-12.5 mg per tablet Take 1 Tablet by mouth daily. 90 Tablet 1    atorvastatin (LIPITOR) 20 mg tablet Take 1 Tablet by mouth daily. 90 Tablet 1    fexofenadine (ALLEGRA) 180 mg tablet Take 1 Tablet by mouth daily. 90 Tablet 1    albuterol (PROVENTIL HFA, VENTOLIN HFA, PROAIR HFA) 90 mcg/actuation inhaler Take 2 Puffs by inhalation every four (4) hours as needed for Wheezing. 1 Inhaler 1    vitamin e (E GEMS) 1,000 unit capsule Take 1,000 Units by mouth daily.  aspirin (ASPIR-81 PO) Take  by mouth.  ascorbic acid (VITAMIN C PO) Take  by mouth.  multivitamin (ONE A DAY) tablet Take 1 Tab by mouth daily.          Past History     Past Medical History:  Past Medical History:   Diagnosis Date    Hyperlipidemia     Hypertension     Neuropathy     bilateral feet     Osteoporosis        Past Surgical History:  Past Surgical History:   Procedure Laterality Date    HX COLONOSCOPY      due 2019 but due to COVID-19 will wait    HX HEENT      open up duct in eye       Family History:  Family History   Problem Relation Age of Onset    Dementia Mother     Heart Disease Father         valve problem     Cancer Brother         Leukemia     Diabetes Brother     Heart Attack Brother         x3    Stroke Brother         x3       Social History:  Social History     Tobacco Use    Smoking status: Former Smoker     Packs/day: 1.50     Years: 50.00     Pack years: 75.00     Types: Cigarettes     Quit date: 2020     Years since quittin.3    Smokeless tobacco: Never Used   Vaping Use    Vaping Use: Never used   Substance Use Topics    Alcohol use: Yes     Comment: special occassions     Drug use: Never       Allergies:  No Known Allergies      Review of Systems     Review of Systems   Constitutional: Negative. Negative for appetite change, chills, fatigue and fever. HENT: Negative. Negative for congestion and sinus pain. Eyes: Negative. Negative for pain and visual disturbance. Respiratory: Negative. Negative for chest tightness and shortness of breath. Cardiovascular: Negative. Negative for chest pain. Gastrointestinal: Negative. Negative for abdominal pain, diarrhea, nausea and vomiting. Genitourinary: Negative. Negative for difficulty urinating. No discharge   Musculoskeletal: Negative. Negative for arthralgias. Skin: Negative. Negative for rash. Neurological: Positive for weakness and numbness. Negative for headaches. Hematological: Negative. Psychiatric/Behavioral: Negative. Negative for agitation. The patient is not nervous/anxious. All other systems reviewed and are negative. Physical Exam     Physical Exam  Vitals and nursing note reviewed. Constitutional:       General: She is not in acute distress. Appearance: She is well-developed.    HENT:      Head: Normocephalic and atraumatic. Nose: Nose normal.      Mouth/Throat:      Mouth: Mucous membranes are moist.      Pharynx: Oropharynx is clear. No oropharyngeal exudate. Eyes:      General:         Right eye: No discharge. Left eye: No discharge. Conjunctiva/sclera: Conjunctivae normal.      Pupils: Pupils are equal, round, and reactive to light. Cardiovascular:      Rate and Rhythm: Normal rate and regular rhythm. Chest Wall: PMI is not displaced. No thrill. Heart sounds: Normal heart sounds. No murmur heard. No friction rub. No gallop. Pulmonary:      Effort: Pulmonary effort is normal. No respiratory distress. Breath sounds: Normal breath sounds. No wheezing or rales. Chest:      Chest wall: No tenderness. Abdominal:      General: Bowel sounds are normal. There is no distension. Palpations: Abdomen is soft. There is no mass. Tenderness: There is no abdominal tenderness. There is no guarding or rebound. Musculoskeletal:         General: Normal range of motion. Cervical back: Normal range of motion and neck supple. Lymphadenopathy:      Cervical: No cervical adenopathy. Skin:     General: Skin is warm and dry. Capillary Refill: Capillary refill takes less than 2 seconds. Findings: No erythema or rash. Neurological:      Mental Status: She is alert and oriented to person, place, and time. Cranial Nerves: No cranial nerve deficit.       Coordination: Coordination normal.   Psychiatric:         Mood and Affect: Mood normal.         Behavior: Behavior normal.         Lab and Diagnostic Study Results     Labs -     Recent Results (from the past 12 hour(s))   GLUCOSE, POC    Collection Time: 12/29/21  4:19 PM   Result Value Ref Range    Glucose (POC) 130 (H) 65 - 117 mg/dL    Performed by Erik Cantor    CBC WITH AUTOMATED DIFF    Collection Time: 12/29/21  4:45 PM   Result Value Ref Range    WBC 10.9 3.6 - 11.0 K/uL    RBC 3.98 3.80 - 5.20 M/uL    HGB 12.4 11.5 - 16.0 g/dL    HCT 36.0 35.0 - 47.0 %    MCV 90.5 80.0 - 99.0 FL    MCH 31.2 26.0 - 34.0 PG    MCHC 34.4 30.0 - 36.5 g/dL    RDW 12.7 11.5 - 14.5 %    PLATELET 245 (H) 185 - 400 K/uL    MPV 9.5 8.9 - 12.9 FL    NRBC 0.0 0.0  WBC    ABSOLUTE NRBC 0.00 0.00 - 0.01 K/uL    NEUTROPHILS 69 32 - 75 %    LYMPHOCYTES 22 12 - 49 %    MONOCYTES 7 5 - 13 %    EOSINOPHILS 1 0 - 7 %    BASOPHILS 0 0 - 1 %    IMMATURE GRANULOCYTES 1 (H) 0 - 0.5 %    ABS. NEUTROPHILS 7.6 1.8 - 8.0 K/UL    ABS. LYMPHOCYTES 2.4 0.8 - 3.5 K/UL    ABS. MONOCYTES 0.8 0.0 - 1.0 K/UL    ABS. EOSINOPHILS 0.1 0.0 - 0.4 K/UL    ABS. BASOPHILS 0.0 0.0 - 0.1 K/UL    ABS. IMM. GRANS. 0.1 (H) 0.00 - 0.04 K/UL    DF AUTOMATED         Radiologic Studies -   [unfilled]  CT Results  (Last 48 hours)               12/29/21 1626  CT CODE NEURO HEAD WO CONTRAST Final result    Impression:  Small hypodensity in the left basal ganglia which could be small   vessel ischemic disease or infarction, age indeterminate. No acute intracranial   hemorrhage. Other findings as above. Findings conveyed to Viji Morales, charge nurse on behalf of Dr. Miranda Palacios on 12/29/2021   at 4:57 PM.               Narrative:  CT head, 12/29/2021       History: Stroke. Technique: No intravenous contrast was administered. Multiple contiguous axial   images were acquired from the vertex to the skull base. Coronal and sagittal   reconstruction was made. All CT scans at this facility are performed using dose reduction optimization   techniques as appropriate to perform the exam including the following: Automated   exposure control, adjustments of the mA and/or kV according to patient size, or   use iterative reconstruction technique. Comparison: None. Findings: Mild cortical volume loss and ventricular system size are appropriate   for the patient's age.   There is small hypodensity in the left basal ganglia   which could be sequela of small vessel ischemic disease or infarction, age   indeterminate. Gray-white differentiation is preserved. No acute intracranial   hemorrhage or midline shift is identified. The calvarium is intact. The included orbits and globes are intact. The remainder of the visualized   paranasal sinuses and mastoid air cells are clear. CXR Results  (Last 48 hours)    None          Medical Decision Making and ED Course   - I am the first and primary provider for this patient AND AM THE PRIMARY PROVIDER OF RECORD. - I reviewed the vital signs, available nursing notes, past medical history, past surgical history, family history and social history. - Initial assessment performed. The patients presenting problems have been discussed, and the staff are in agreement with the care plan formulated and outlined with them. I have encouraged them to ask questions as they arise throughout their visit. Vital Signs-Reviewed the patient's vital signs. Patient Vitals for the past 12 hrs:   Temp Pulse Resp BP SpO2   12/29/21 1617 98 °F (36.7 °C) 98 18 (!) 194/76 99 %     Records Reviewed: Nursing Notes    ED Course:              Provider Notes (Medical Decision Making):   73-year-old female with acute onset of TIA type symptoms. Resolved not a TPA candidate. MDM           Consultations:       Consultations: - NONE        Procedures and Critical Care       Performed by: Dee Chan MD  PROCEDURES:  Procedures       Disposition     Disposition: Condition stable        Remove if not discharged  DISCHARGE PLAN:  1. Current Discharge Medication List      CONTINUE these medications which have NOT CHANGED    Details   telmisartan-hydroCHLOROthiazide (MICARDIS HCT) 40-12.5 mg per tablet Take 1 Tablet by mouth daily. Qty: 90 Tablet, Refills: 1      atorvastatin (LIPITOR) 20 mg tablet Take 1 Tablet by mouth daily.   Qty: 90 Tablet, Refills: 1    Associated Diagnoses: Mixed hyperlipidemia      fexofenadine (ALLEGRA) 180 mg tablet Take 1 Tablet by mouth daily. Qty: 90 Tablet, Refills: 1    Associated Diagnoses: Cough; Acute bronchitis, unspecified organism      albuterol (PROVENTIL HFA, VENTOLIN HFA, PROAIR HFA) 90 mcg/actuation inhaler Take 2 Puffs by inhalation every four (4) hours as needed for Wheezing. Qty: 1 Inhaler, Refills: 1    Associated Diagnoses: Cough; Acute bronchitis, unspecified organism      vitamin e (E GEMS) 1,000 unit capsule Take 1,000 Units by mouth daily. aspirin (ASPIR-81 PO) Take  by mouth. ascorbic acid (VITAMIN C PO) Take  by mouth.      multivitamin (ONE A DAY) tablet Take 1 Tab by mouth daily. 2.   Follow-up Information    None       3. Return to ED if worse   4. Current Discharge Medication List          Diagnosis     Clinical Impression:   1. TIA (transient ischemic attack)        Attestations:    Dee Chan MD    Please note that this dictation was completed with [employer], the computer voice recognition software. Quite often unanticipated grammatical, syntax, homophones, and other interpretive errors are inadvertently transcribed by the computer software. Please disregard these errors. Please excuse any errors that have escaped final proofreading. Thank you.

## 2021-12-30 ENCOUNTER — APPOINTMENT (OUTPATIENT)
Dept: MRI IMAGING | Age: 71
End: 2021-12-30
Attending: STUDENT IN AN ORGANIZED HEALTH CARE EDUCATION/TRAINING PROGRAM
Payer: MEDICARE

## 2021-12-30 ENCOUNTER — APPOINTMENT (OUTPATIENT)
Dept: NON INVASIVE DIAGNOSTICS | Age: 71
End: 2021-12-30
Attending: STUDENT IN AN ORGANIZED HEALTH CARE EDUCATION/TRAINING PROGRAM
Payer: MEDICARE

## 2021-12-30 ENCOUNTER — APPOINTMENT (OUTPATIENT)
Dept: NON INVASIVE DIAGNOSTICS | Age: 71
End: 2021-12-30
Attending: INTERNAL MEDICINE
Payer: MEDICARE

## 2021-12-30 ENCOUNTER — TELEPHONE (OUTPATIENT)
Dept: FAMILY MEDICINE CLINIC | Age: 71
End: 2021-12-30

## 2021-12-30 VITALS
SYSTOLIC BLOOD PRESSURE: 144 MMHG | RESPIRATION RATE: 18 BRPM | DIASTOLIC BLOOD PRESSURE: 69 MMHG | HEIGHT: 64 IN | HEART RATE: 80 BPM | TEMPERATURE: 98.2 F | WEIGHT: 120 LBS | OXYGEN SATURATION: 98 % | BODY MASS INDEX: 20.49 KG/M2

## 2021-12-30 LAB
ALBUMIN SERPL-MCNC: 3.2 G/DL (ref 3.5–5)
ALBUMIN/GLOB SERPL: 1.1 {RATIO} (ref 1.1–2.2)
ALP SERPL-CCNC: 65 U/L (ref 45–117)
ALT SERPL-CCNC: 18 U/L (ref 12–78)
ANION GAP SERPL CALC-SCNC: 6 MMOL/L (ref 5–15)
AST SERPL W P-5'-P-CCNC: 9 U/L (ref 15–37)
ATRIAL RATE: 80 BPM
BASOPHILS # BLD: 0 K/UL (ref 0–0.1)
BASOPHILS NFR BLD: 0 % (ref 0–1)
BILIRUB SERPL-MCNC: 0.3 MG/DL (ref 0.2–1)
BUN SERPL-MCNC: 10 MG/DL (ref 6–20)
BUN/CREAT SERPL: 18 (ref 12–20)
CA-I BLD-MCNC: 8.6 MG/DL (ref 8.5–10.1)
CALCULATED P AXIS, ECG09: 77 DEGREES
CALCULATED R AXIS, ECG10: 39 DEGREES
CALCULATED T AXIS, ECG11: 30 DEGREES
CHLORIDE SERPL-SCNC: 101 MMOL/L (ref 97–108)
CO2 SERPL-SCNC: 26 MMOL/L (ref 21–32)
CREAT SERPL-MCNC: 0.55 MG/DL (ref 0.55–1.02)
DIAGNOSIS, 93000: NORMAL
DIFFERENTIAL METHOD BLD: ABNORMAL
ECHO AO ROOT DIAM: 2.8 CM
ECHO AO ROOT INDEX: 1.78 CM/M2
ECHO AV PEAK GRADIENT: 14 MMHG
ECHO AV PEAK VELOCITY: 1.9 M/S
ECHO AV VELOCITY RATIO: 0.58
ECHO EST RA PRESSURE: 3 MMHG
ECHO LA DIAMETER INDEX: 2.1 CM/M2
ECHO LA DIAMETER: 3.3 CM
ECHO LA TO AORTIC ROOT RATIO: 1.18
ECHO LV E' LATERAL VELOCITY: 13 CM/S
ECHO LV E' SEPTAL VELOCITY: 11 CM/S
ECHO LV EJECTION FRACTION BIPLANE: 67 % (ref 55–100)
ECHO LV FRACTIONAL SHORTENING: 36 % (ref 28–44)
ECHO LV INTERNAL DIMENSION DIASTOLE INDEX: 2.8 CM/M2
ECHO LV INTERNAL DIMENSION DIASTOLIC: 4.4 CM (ref 3.9–5.3)
ECHO LV INTERNAL DIMENSION SYSTOLIC INDEX: 1.78 CM/M2
ECHO LV INTERNAL DIMENSION SYSTOLIC: 2.8 CM
ECHO LV IVSD: 0.9 CM (ref 0.6–0.9)
ECHO LV MASS 2D: 128 G (ref 67–162)
ECHO LV MASS INDEX 2D: 81.5 G/M2 (ref 43–95)
ECHO LV POSTERIOR WALL DIASTOLIC: 0.9 CM (ref 0.6–0.9)
ECHO LV RELATIVE WALL THICKNESS RATIO: 0.41
ECHO LVOT PEAK GRADIENT: 5 MMHG
ECHO LVOT PEAK VELOCITY: 1.1 M/S
ECHO MV A VELOCITY: 1.25 M/S
ECHO MV E DECELERATION TIME (DT): 211 MS
ECHO MV E VELOCITY: 0.96 M/S
ECHO MV E/A RATIO: 0.77
ECHO MV E/E' LATERAL: 7.38
ECHO MV E/E' RATIO (AVERAGED): 8.06
ECHO MV E/E' SEPTAL: 8.73
ECHO MV MAX VELOCITY: 1.3 M/S
ECHO MV MEAN GRADIENT: 2 MMHG
ECHO MV MEAN VELOCITY: 0.7 M/S
ECHO MV PEAK GRADIENT: 6 MMHG
ECHO MV REGURGITANT PEAK GRADIENT: 96 MMHG
ECHO MV REGURGITANT PEAK VELOCITY: 4.9 M/S
ECHO MV VTI: 38.2 CM
ECHO PV MAX VELOCITY: 1.1 M/S
ECHO PV PEAK GRADIENT: 4 MMHG
ECHO PVEIN A DURATION: 95 MS
ECHO PVEIN A VELOCITY: 0.4 M/S
ECHO RV INTERNAL DIMENSION: 2.7 CM
ECHO TV REGURGITANT MAX VELOCITY: 2.69 M/S
ECHO TV REGURGITANT MAX VELOCITY: 2.69 M/S
ECHO TV REGURGITANT PEAK GRADIENT: 29 MMHG
EOSINOPHIL # BLD: 0.1 K/UL (ref 0–0.4)
EOSINOPHIL NFR BLD: 1 % (ref 0–7)
ERYTHROCYTE [DISTWIDTH] IN BLOOD BY AUTOMATED COUNT: 12.9 % (ref 11.5–14.5)
GLOBULIN SER CALC-MCNC: 2.8 G/DL (ref 2–4)
GLUCOSE SERPL-MCNC: 146 MG/DL (ref 65–100)
HCT VFR BLD AUTO: 32.7 % (ref 35–47)
HGB BLD-MCNC: 11.2 G/DL (ref 11.5–16)
IMM GRANULOCYTES # BLD AUTO: 0 K/UL (ref 0–0.04)
IMM GRANULOCYTES NFR BLD AUTO: 0 % (ref 0–0.5)
LEFT ARM BP: 139 MMHG
LEFT CCA DIST DIAS: 35.7 CM/S
LEFT CCA DIST SYS: 166 CM/S
LEFT CCA PROX DIAS: 38.5 CM/S
LEFT CCA PROX SYS: 169 CM/S
LEFT ECA DIAS: 2.15 CM/S
LEFT ECA SYS: 137 CM/S
LEFT ICA DIST DIAS: 37.1 CM/S
LEFT ICA DIST SYS: 164 CM/S
LEFT ICA PROX DIAS: 26.9 CM/S
LEFT ICA PROX SYS: 157 CM/S
LEFT ICA/CCA SYS: 0.95
LEFT VERTEBRAL DIAS: 0 CM/S
LEFT VERTEBRAL SYS: 49.4 CM/S
LYMPHOCYTES # BLD: 2.4 K/UL (ref 0.8–3.5)
LYMPHOCYTES NFR BLD: 25 % (ref 12–49)
MCH RBC QN AUTO: 30.9 PG (ref 26–34)
MCHC RBC AUTO-ENTMCNC: 34.3 G/DL (ref 30–36.5)
MCV RBC AUTO: 90.3 FL (ref 80–99)
MONOCYTES # BLD: 0.8 K/UL (ref 0–1)
MONOCYTES NFR BLD: 8 % (ref 5–13)
NEUTS SEG # BLD: 6.2 K/UL (ref 1.8–8)
NEUTS SEG NFR BLD: 66 % (ref 32–75)
NRBC # BLD: 0 K/UL (ref 0–0.01)
NRBC BLD-RTO: 0 PER 100 WBC
P-R INTERVAL, ECG05: 142 MS
PLATELET # BLD AUTO: 362 K/UL (ref 150–400)
PMV BLD AUTO: 9.9 FL (ref 8.9–12.9)
POTASSIUM SERPL-SCNC: 3.8 MMOL/L (ref 3.5–5.1)
PROT SERPL-MCNC: 6 G/DL (ref 6.4–8.2)
Q-T INTERVAL, ECG07: 374 MS
QRS DURATION, ECG06: 94 MS
QTC CALCULATION (BEZET), ECG08: 431 MS
RBC # BLD AUTO: 3.62 M/UL (ref 3.8–5.2)
RIGHT ARM BP: 132 MMHG
RIGHT CCA DIST DIAS: 34.4 CM/S
RIGHT CCA DIST SYS: 157 CM/S
RIGHT CCA PROX DIAS: 27 CM/S
RIGHT CCA PROX SYS: 146 CM/S
RIGHT ECA DIAS: 16 CM/S
RIGHT ECA SYS: 150 CM/S
RIGHT ICA DIST DIAS: 38.1 CM/S
RIGHT ICA DIST SYS: 144 CM/S
RIGHT ICA PROX DIAS: 35.6 CM/S
RIGHT ICA PROX SYS: 152 CM/S
RIGHT ICA/CCA SYS: 0.97
RIGHT VERTEBRAL DIAS: 20.6 CM/S
RIGHT VERTEBRAL SYS: 102 CM/S
SODIUM SERPL-SCNC: 133 MMOL/L (ref 136–145)
VAS LEFT SUBCLAVIAN PROX EDV: 0 CM/S
VAS LEFT SUBCLAVIAN PROX PSV: 147 CM/S
VAS RIGHT SUBCLAVIAN PROX EDV: 0 CM/S
VAS RIGHT SUBCLAVIAN PROX PSV: 101 CM/S
VENTRICULAR RATE, ECG03: 80 BPM
WBC # BLD AUTO: 9.5 K/UL (ref 3.6–11)

## 2021-12-30 PROCEDURE — G0378 HOSPITAL OBSERVATION PER HR: HCPCS

## 2021-12-30 PROCEDURE — 74011250637 HC RX REV CODE- 250/637: Performed by: INTERNAL MEDICINE

## 2021-12-30 PROCEDURE — 93005 ELECTROCARDIOGRAM TRACING: CPT

## 2021-12-30 PROCEDURE — 93880 EXTRACRANIAL BILAT STUDY: CPT

## 2021-12-30 PROCEDURE — 80053 COMPREHEN METABOLIC PANEL: CPT

## 2021-12-30 PROCEDURE — 70551 MRI BRAIN STEM W/O DYE: CPT

## 2021-12-30 PROCEDURE — 74011250637 HC RX REV CODE- 250/637: Performed by: STUDENT IN AN ORGANIZED HEALTH CARE EDUCATION/TRAINING PROGRAM

## 2021-12-30 PROCEDURE — 96374 THER/PROPH/DIAG INJ IV PUSH: CPT

## 2021-12-30 PROCEDURE — 85025 COMPLETE CBC W/AUTO DIFF WBC: CPT

## 2021-12-30 PROCEDURE — 83036 HEMOGLOBIN GLYCOSYLATED A1C: CPT

## 2021-12-30 PROCEDURE — 74011250636 HC RX REV CODE- 250/636: Performed by: INTERNAL MEDICINE

## 2021-12-30 PROCEDURE — 36415 COLL VENOUS BLD VENIPUNCTURE: CPT

## 2021-12-30 RX ORDER — ASPIRIN 325 MG
325 TABLET ORAL DAILY
Status: DISCONTINUED | OUTPATIENT
Start: 2021-12-31 | End: 2021-12-30

## 2021-12-30 RX ORDER — ATORVASTATIN CALCIUM 40 MG/1
80 TABLET, FILM COATED ORAL DAILY
Qty: 60 TABLET | Refills: 0 | Status: SHIPPED
Start: 2021-12-31 | End: 2022-01-14 | Stop reason: DRUGHIGH

## 2021-12-30 RX ORDER — ATORVASTATIN CALCIUM 40 MG/1
40 TABLET, FILM COATED ORAL DAILY
Status: DISCONTINUED | OUTPATIENT
Start: 2021-12-30 | End: 2021-12-30 | Stop reason: HOSPADM

## 2021-12-30 RX ORDER — ASPIRIN 325 MG
325 TABLET ORAL DAILY
Status: DISCONTINUED | OUTPATIENT
Start: 2021-12-30 | End: 2021-12-30 | Stop reason: HOSPADM

## 2021-12-30 RX ADMIN — ASPIRIN 325 MG ORAL TABLET 325 MG: 325 PILL ORAL at 17:20

## 2021-12-30 RX ADMIN — Medication 10 ML: at 06:13

## 2021-12-30 RX ADMIN — MULTIVITAMIN TABLET 1 TABLET: TABLET at 10:25

## 2021-12-30 RX ADMIN — VITAMIN E CAP 100 UNIT 1000 UNITS: 100 CAP at 10:24

## 2021-12-30 RX ADMIN — Medication 10 ML: at 17:20

## 2021-12-30 RX ADMIN — ATORVASTATIN CALCIUM 40 MG: 40 TABLET, FILM COATED ORAL at 10:25

## 2021-12-30 RX ADMIN — ENOXAPARIN SODIUM 40 MG: 100 INJECTION SUBCUTANEOUS at 10:25

## 2021-12-30 NOTE — PROGRESS NOTES
Virtual reviewer communicated change to CM, which reflect outpatient observation order written prior to Written discharge order. Code 44 delivered and given to patient. CM met with the patient and provided to the patient the printed information about her outpatient observation status. The patient was given the flyer entitled, \"Medicare Outpatient Observation: Information & notification. \" All questions were answered, no additional discharge needs identified at this time and patient expects to return to their (home, assisted living facility, relatives home, etc.) after discharge today.   Copy provided to patient or sent secure email, secure fax , or certified mail to patient's loved one per their request.

## 2021-12-30 NOTE — PROGRESS NOTES
Problem: General Medical Care Plan  Goal: *Vital signs within specified parameters  Outcome: Progressing Towards Goal  Goal: *Labs within defined limits  Outcome: Progressing Towards Goal  Goal: *Absence of infection signs and symptoms  Outcome: Progressing Towards Goal  Goal: *Optimal pain control at patient's stated goal  Outcome: Progressing Towards Goal  Goal: *Skin integrity maintained  Outcome: Progressing Towards Goal  Goal: *Fluid volume balance  Outcome: Progressing Towards Goal  Goal: *Optimize nutritional status  Outcome: Progressing Towards Goal  Goal: *Anxiety reduced or absent  Outcome: Progressing Towards Goal  Goal: *Progressive mobility and function (eg: ADL's)  Outcome: Progressing Towards Goal     Problem: Patient Education: Go to Patient Education Activity  Goal: Patient/Family Education  Outcome: Progressing Towards Goal     Problem: Discharge Planning  Goal: *Discharge to safe environment  Outcome: Progressing Towards Goal  Goal: *Knowledge of medication management  Outcome: Progressing Towards Goal  Goal: *Knowledge of discharge instructions  Outcome: Progressing Towards Goal     Problem: Patient Education: Go to Patient Education Activity  Goal: Patient/Family Education  Outcome: Progressing Towards Goal   Pt Aox3, vss, lungs cta +bsx4 last bm 12/29/21. Pt is ambulatory, skin intact, nvi. Pt is being monitored d/t stroke like symptoms pt experienced at home. Pt denies pain at this time. Will continue to monitor pt's status.

## 2021-12-30 NOTE — PROGRESS NOTES
OT eval order received and acknowledged. Pt screened and demonstrating baseline independence for self care tasks and functional mobility/transfers. Pt reports no difficulty with getting self to/from bathroom or completing self cares during hospital stay; pt states all numbness/tingling has currently resolved. Pt reports no need for skilled OT services at this time. OT evaluation order will therefore be discontinued as pt has no acute OT needs. Please reorder OT if pt functional status changes. Thank you.

## 2021-12-30 NOTE — H&P
GENERAL GENERIC H&P/CONSULT    Subjective:    71F with a past medical history of 50 years of ongoing smoking, hypertension, hyperlipidemia. She reports being fully vaccinated against coronavirus including booster vaccination. 12/29/21 presents to hospital with acute onset of numbness in her left hand as well as over her left lip. As well as some weakness on the left side of her body with regard to getting up. She noticed it when she was trying to get up from a sofa. Some headache and dizziness. No loss of consciousness, no fall. Denies fevers, chills, chest pains, palpitations, nausea, vomiting, shortness of breath, leg swelling. Patient reports neurologic symptoms resolved by the time of the ED course, and I have been asked to admit her to hospital for TIA workup. CT head IMPRESSION  Small hypodensity in the left basal ganglia which could be small  vessel ischemic disease or infarction, age indeterminate. No acute intracranial  hemorrhage. Other findings as above. Past Medical History:   Diagnosis Date    Hyperlipidemia     Hypertension     Neuropathy     bilateral feet     Osteoporosis       Past Surgical History:   Procedure Laterality Date    HX COLONOSCOPY      due 2019 but due to COVID-19 will wait    HX HEENT      open up duct in eye      Prior to Admission medications    Medication Sig Start Date End Date Taking? Authorizing Provider   latanoprost (XALATAN) 0.005 % ophthalmic solution Administer 1 Drop to both eyes nightly. Yes Provider, Historical   telmisartan-hydroCHLOROthiazide (MICARDIS HCT) 40-12.5 mg per tablet Take 1 Tablet by mouth daily. Patient taking differently: Take 1 Tablet by mouth every evening. 10/25/21   Alma Cross, DO   atorvastatin (LIPITOR) 20 mg tablet Take 1 Tablet by mouth daily. 7/27/21   Alma Cross, DO   vitamin e (E GEMS) 1,000 unit capsule Take 1,000 Units by mouth daily. Provider, Historical   aspirin (ASPIR-81 PO) Take  by mouth.     Provider, Historical   ascorbic acid (VITAMIN C PO) Take  by mouth. Provider, Historical   multivitamin (ONE A DAY) tablet Take 1 Tab by mouth daily. Provider, Historical     No Known Allergies   Social History     Tobacco Use    Smoking status: Former Smoker     Packs/day: 1.50     Years: 50.00     Pack years: 75.00     Types: Cigarettes     Quit date: 2020     Years since quittin.3    Smokeless tobacco: Never Used   Substance Use Topics    Alcohol use: Yes     Comment: special occassions       Family History   Problem Relation Age of Onset    Dementia Mother     Heart Disease Father         valve problem     Cancer Brother         Leukemia     Diabetes Brother     Heart Attack Brother         x3    Stroke Brother         x3      Review of Systems   All other systems reviewed and are negative. Objective:    No intake/output data recorded. No intake/output data recorded. Patient Vitals for the past 8 hrs:   BP Temp Pulse Resp SpO2 Height Weight   21 1830 (!) 162/71 98 °F (36.7 °C) 89 16 94 %     21 1800 (!) 150/81 98 °F (36.7 °C) 84 16 95 %     21 1730 (!) 169/75 98 °F (36.7 °C) 83 15 96 %     21 1700 (!) 173/107 98 °F (36.7 °C) 84 16 97 %     21 1630 (!) 179/70 98 °F (36.7 °C) 90 17 99 %     21 1617 (!) 194/76 98 °F (36.7 °C) 98 18 99 % 5' 4\" (1.626 m) 54.4 kg (120 lb)     Physical Exam  Vitals and nursing note reviewed. Constitutional:       Appearance: Normal appearance. HENT:      Head: Normocephalic and atraumatic. Nose: Nose normal.      Mouth/Throat:      Mouth: Mucous membranes are dry. Eyes:      Extraocular Movements: Extraocular movements intact. Pupils: Pupils are equal, round, and reactive to light. Cardiovascular:      Rate and Rhythm: Normal rate and regular rhythm. Pulses: Normal pulses. Heart sounds: Normal heart sounds.    Pulmonary:      Effort: Pulmonary effort is normal.      Breath sounds: Normal breath sounds. Abdominal:      General: Abdomen is flat. Palpations: Abdomen is soft. Musculoskeletal:      Cervical back: Normal range of motion and neck supple. Skin:     General: Skin is warm and dry. Neurological:      General: No focal deficit present. Mental Status: She is alert and oriented to person, place, and time. Mental status is at baseline. Cranial Nerves: No cranial nerve deficit. Motor: No weakness. Psychiatric:         Mood and Affect: Mood normal.          Labs:    Recent Results (from the past 24 hour(s))   GLUCOSE, POC    Collection Time: 12/29/21  4:19 PM   Result Value Ref Range    Glucose (POC) 130 (H) 65 - 117 mg/dL    Performed by Marixa Garica    CBC WITH AUTOMATED DIFF    Collection Time: 12/29/21  4:45 PM   Result Value Ref Range    WBC 10.9 3.6 - 11.0 K/uL    RBC 3.98 3.80 - 5.20 M/uL    HGB 12.4 11.5 - 16.0 g/dL    HCT 36.0 35.0 - 47.0 %    MCV 90.5 80.0 - 99.0 FL    MCH 31.2 26.0 - 34.0 PG    MCHC 34.4 30.0 - 36.5 g/dL    RDW 12.7 11.5 - 14.5 %    PLATELET 249 (H) 270 - 400 K/uL    MPV 9.5 8.9 - 12.9 FL    NRBC 0.0 0.0  WBC    ABSOLUTE NRBC 0.00 0.00 - 0.01 K/uL    NEUTROPHILS 69 32 - 75 %    LYMPHOCYTES 22 12 - 49 %    MONOCYTES 7 5 - 13 %    EOSINOPHILS 1 0 - 7 %    BASOPHILS 0 0 - 1 %    IMMATURE GRANULOCYTES 1 (H) 0 - 0.5 %    ABS. NEUTROPHILS 7.6 1.8 - 8.0 K/UL    ABS. LYMPHOCYTES 2.4 0.8 - 3.5 K/UL    ABS. MONOCYTES 0.8 0.0 - 1.0 K/UL    ABS. EOSINOPHILS 0.1 0.0 - 0.4 K/UL    ABS. BASOPHILS 0.0 0.0 - 0.1 K/UL    ABS. IMM.  GRANS. 0.1 (H) 0.00 - 0.04 K/UL    DF AUTOMATED     METABOLIC PANEL, COMPREHENSIVE    Collection Time: 12/29/21  4:45 PM   Result Value Ref Range    Sodium 130 (L) 136 - 145 mmol/L    Potassium 3.9 3.5 - 5.1 mmol/L    Chloride 97 97 - 108 mmol/L    CO2 28 21 - 32 mmol/L    Anion gap 5 5 - 15 mmol/L    Glucose 119 (H) 65 - 100 mg/dL    BUN 11 6 - 20 mg/dL    Creatinine 0.67 0.55 - 1.02 mg/dL    BUN/Creatinine ratio 16 12 - 20 GFR est AA >60 >60 ml/min/1.73m2    GFR est non-AA >60 >60 ml/min/1.73m2    Calcium 9.1 8.5 - 10.1 mg/dL    Bilirubin, total 0.2 0.2 - 1.0 mg/dL    AST (SGOT) 17 15 - 37 U/L    ALT (SGPT) 21 12 - 78 U/L    Alk. phosphatase 79 45 - 117 U/L    Protein, total 7.1 6.4 - 8.2 g/dL    Albumin 3.9 3.5 - 5.0 g/dL    Globulin 3.2 2.0 - 4.0 g/dL    A-G Ratio 1.2 1.1 - 2.2     TROPONIN-HIGH SENSITIVITY    Collection Time: 12/29/21  4:45 PM   Result Value Ref Range    Troponin-High Sensitivity 6 0 - 51 ng/L       ECG: ordered    Assessment:  Active Problems:    TIA (transient ischemic attack) (12/29/2021)    71F with a past medical history of 50 years of ongoing smoking, hypertension, hyperlipidemia. She reports being fully vaccinated against coronavirus including booster vaccination. 12/29/21 presents to hospital with acute onset of numbness in her left hand as well as over her left lip. As well as some weakness on the left side of her body with regard to getting up. She noticed it when she was trying to get up from a sofa. Some headache and dizziness. No loss of consciousness, no fall. Denies fevers, chills, chest pains, palpitations, nausea, vomiting, shortness of breath, leg swelling. Patient reports neurologic symptoms resolved by the time of the ED course, and I have been asked to admit her to hospital for TIA workup. CT head IMPRESSION  Small hypodensity in the left basal ganglia which could be small  vessel ischemic disease or infarction, age indeterminate. No acute intracranial  hemorrhage. Other findings as above. Plan:    1) Transient left sided weakness, resolved by the time of admission. Imaging as above, no acute hemorrhage. Risk factors of smoking, hypertension, dyslipidemia.      Admit to hospital   Telemetry monitoring   Neuro checks   Echocardiogram   Continue atorvastatin and aspirin   Resume antihypertensives tomorrow   Neurology    2) DVT prophylaxis with enoxaparin       Signed:  Jey Garcia Boy Franco MD 12/29/2021

## 2021-12-30 NOTE — TELEPHONE ENCOUNTER
----- Message from René Shoemaker sent at 12/30/2021  3:00 PM EST -----  Subject: Hospital Follow Up    QUESTIONS  What hospital was the Patient Discharged from?   [facility]  Date of Discharge? 2021-12-30  Discharge Location? Home  Reason for hospitalization as patient stated? TIA  What question does the patient have, if applicable?   ---------------------------------------------------------------------------  --------------  CALL BACK INFO  What is the best way for the office to contact you? OK to leave message on   voicemail  Preferred Call Back Phone Number? [phone]  ---------------------------------------------------------------------------  --------------  SCRIPT ANSWERS  Relationship to Patient? Third Party  Representative Name? [address]  (Patient requests to see provider urgently. )? No  (Has the patient been discharged from the hospital within 2 business days   AND does not have a Telephone Encounter  Follow Up From [address] [place] [address]   documented in [address]?)? Yes  Do you have any questions for your primary care provider that need to be   answered prior to your appointment? (Use RN Triage if question pertains to   anything on the red flag list)? No  (Patient needs follow up visit after hospital discharge) Book first   available appointment within 7 days OF DISCHARGE, if no appt, proceed to   book the next available time slot within 14 days OF DISCHARGE AND Send   Message to Provider. [facility] Follow Up appointment cannot be booked   beyond 14 Days and should result in a Message to Provider. ?  Yes

## 2021-12-30 NOTE — PROGRESS NOTES
PT eval order received and acknowledged. Pt screened and is currently presenting at baseline I for functional mobility/transfers and amb. Pt amb I in room without assistance at time of screen, reports all admitting deficits have resolved. PT evaluation order will be discontinued at this time as pt has no acute PT needs. Please reorder PT if pt functional status changes. Thank you.

## 2021-12-30 NOTE — PROGRESS NOTES
12/30/21 1724   Vital Signs   Temp 98.2 °F (36.8 °C)   Temp Source Oral   Pulse (Heart Rate) 80   Heart Rate Source Monitor   Cardiac Rhythm Sinus Rhythm   Resp Rate 18   O2 Sat (%) 98 %   BP (!) 144/69   MAP (Calculated) 94       Discharge vitals:    NO signs of distress upon discharge. Cardiac monitoring removed. IV removed. Reviewed discharge instructions including medications and follow up. Family member at bedside.

## 2021-12-30 NOTE — DISCHARGE SUMMARY
Physician Discharge Summary     Patient ID:    Tony Null  [id number]  80 y.o.  1950    Admit date: 12/29/2021    Discharge date : 12/30/2021    Chronic Diagnoses:    Problem List as of 12/30/2021 Date Reviewed: 11/10/2021          Codes Class Noted - Resolved    TIA (transient ischemic attack) ICD-10-CM: G45.9  ICD-9-CM: 435.9  12/29/2021 - Present        Hypertension ICD-10-CM: I10  ICD-9-CM: 401.9  Unknown - Present        Hyperlipidemia ICD-10-CM: E78.5  ICD-9-CM: 272.4  Unknown - Present        Osteoporosis ICD-10-CM: M81.0  ICD-9-CM: 733.00  Unknown - Present          22    Final Diagnoses:   TIA (transient ischemic attack) [G45.9]    Reason for Hospitalization:    Left facial numbness/LUE weakness    Hospital Course:     80year-old pleasant lady with history of essential hypertension hyperlipidemia admitted for left upper extremity weakness and numbness. Symptoms alcohol the evening prior to presentation and resolved prior to patient getting to the ED. Emergent CT head revealed possible hypoattenuation of the left basal ganglia. The echocardiogram showed a normal ejection fraction with positive bubble study. Carotid Dopplers showed moderate stenosis bilaterally 50 to 65%. An MRI of the brain has been ordered will be completed later today. Patient has no focal deficit. No numbness or tingling. Lipitor increased from 20 mg to 80 mg daily. Advice good control of blood pressure at home. Overall clinically stable for discharge with outpatient follow-up once MRI brain completed            Discharge Medications:   Current Discharge Medication List      CONTINUE these medications which have CHANGED    Details   atorvastatin (LIPITOR) 40 mg tablet Take 2 Tablets by mouth daily for 30 days.   Qty: 60 Tablet, Refills: 0  Start date: 12/31/2021, End date: 1/30/2022         CONTINUE these medications which have NOT CHANGED    Details   latanoprost (XALATAN) 0.005 % ophthalmic solution Administer 1 Drop to both eyes nightly. telmisartan-hydroCHLOROthiazide (MICARDIS HCT) 40-12.5 mg per tablet Take 1 Tablet by mouth daily. Qty: 90 Tablet, Refills: 1      vitamin e (E GEMS) 1,000 unit capsule Take 1,000 Units by mouth daily. aspirin (ASPIR-81 PO) Take  by mouth. ascorbic acid (VITAMIN C PO) Take  by mouth.      multivitamin (ONE A DAY) tablet Take 1 Tab by mouth daily. Follow up Care:    1. Mable Peters DO in 1-2 weeks. Please call to set up an appointment shortly after discharge. Diet:  Cardiac Diet    Disposition:  Home. Advanced Directive:   FULL    DNR      Discharge Exam:  Visit Vitals  BP (!) 144/72 (BP 1 Location: Left upper arm, BP Patient Position: At rest)   Pulse 76   Temp 98.2 °F (36.8 °C)   Resp 18   Ht 5' 4\" (1.626 m)   Wt 54.4 kg (120 lb)   SpO2 98%   Breastfeeding No   BMI 20.60 kg/m²      O2 Device: None (Room air)    Temp (24hrs), Av °F (36.7 °C), Min:97.8 °F (36.6 °C), Max:98.2 °F (36.8 °C)    No intake/output data recorded. No intake/output data recorded. General:  Alert, cooperative, no distress, appears stated age. Lungs:   Clear to auscultation bilaterally. Chest wall:  No tenderness or deformity. Heart:  Regular rate and rhythm, S1, S2 normal, no murmur, click, rub or gallop. Abdomen:   Soft, non-tender. Bowel sounds normal. No masses,  No organomegaly. Extremities: Extremities normal, atraumatic, no cyanosis or edema. Pulses: 2+ and symmetric all extremities. Skin: Skin color, texture, turgor normal. No rashes or lesions   Neurologic: CNII-XII intact. No gross sensory or motor deficits         CONSULTATIONS: Neurology    Significant Diagnostic Studies:   2021: BUN 11 mg/dL (Ref range: 6 - 20 mg/dL); Calcium 9.1 mg/dL (Ref range: 8.5 - 10.1 mg/dL); CO2 28 mmol/L (Ref range: 21 - 32 mmol/L); Creatinine 0.67 mg/dL (Ref range: 0.55 - 1.02 mg/dL);  Glucose 119 mg/dL (H; Ref range: 65 - 100 mg/dL); HCT 36.0 % (Ref range: 35.0 - 47.0 %); HGB 12.4 g/dL (Ref range: 11.5 - 16.0 g/dL); Potassium 3.9 mmol/L (Ref range: 3.5 - 5.1 mmol/L); Sodium 130 mmol/L (L; Ref range: 136 - 145 mmol/L)  12/30/2021: BUN 10 mg/dL (Ref range: 6 - 20 mg/dL); Calcium 8.6 mg/dL (Ref range: 8.5 - 10.1 mg/dL); CO2 26 mmol/L (Ref range: 21 - 32 mmol/L); Creatinine 0.55 mg/dL (Ref range: 0.55 - 1.02 mg/dL); Glucose 146 mg/dL (H; Ref range: 65 - 100 mg/dL); HCT 32.7 % (L; Ref range: 35.0 - 47.0 %); HGB 11.2 g/dL (L; Ref range: 11.5 - 16.0 g/dL); Potassium 3.8 mmol/L (Ref range: 3.5 - 5.1 mmol/L); Sodium 133 mmol/L (L; Ref range: 136 - 145 mmol/L)  Recent Labs     12/30/21  0732 12/29/21  1645   WBC 9.5 10.9   HGB 11.2* 12.4   HCT 32.7* 36.0    409*     Recent Labs     12/30/21  0732 12/29/21  1645   * 130*   K 3.8 3.9    97   CO2 26 28   BUN 10 11   CREA 0.55 0.67   * 119*   CA 8.6 9.1     Recent Labs     12/30/21  0732 12/29/21  1645   ALT 18 21   AP 65 79   TBILI 0.3 0.2   TP 6.0* 7.1   ALB 3.2* 3.9   GLOB 2.8 3.2     Recent Labs     12/29/21  1645   INR 1.1   PTP 13.7   APTT 32.3      No results for input(s): FE, TIBC, PSAT, FERR in the last 72 hours. No results for input(s): PH, PCO2, PO2 in the last 72 hours. No results for input(s): CPK, CKMB in the last 72 hours.     No lab exists for component: TROPONINI  Lab Results   Component Value Date/Time    Glucose (POC) 130 (H) 12/29/2021 04:19 PM       Total Time: 30 minutes    Signed:  Cristin Ramirez MD  12/30/2021  2:26 PM

## 2021-12-30 NOTE — CONSULTS
Neurology Consult Note    HPI  Ms. Dez Sweeney is a 80 y.o.  female with a history significant for TIA, HTN, HL who presented with LF numbness, LHB weakness/numbness. Per chart review, patient symptoms occurred on evening of presentation but all resolved by the time patient arrived to the ED. Emergent CT head revealed possible hypoattenuation in the left basal ganglia. Patient is not deemed a candidate for acute neurological intervention. In the ED, patient was found to have elevated systolic blood pressures up to the 200s. Home Neurovascular Meds: ASA 81, Atorvastatin 20    Workup Completed/Reviewed: CTH WO 12/29, Lipid Panel 11/12, TSH 11/10    Patient reports that she had a TIA or stroke in the past with memory difficulties. No history of seizures or neoplastic disorders. She reports all her symptoms have completely resolved now. IMPRESSION  Ms. Dez Sweeney is a 80 y.o.  female with the above history presented with episodic left facial numbness and left hemibody weakness and numbness. At the time patient arrived to the ED all of her symptoms had resolved. Emergent CT head read as possibly having left basal ganglier hypoattenuation. Patient not deemed candidate for acute neurological intervention. Patient found to have systolic blood pressures up to 200s in the ED. Etiology of patient's symptoms possibly a TIA given comorbidities per above or possibly related to hypertensive encephalopathy given presenting systolic blood pressures were significantly high. Reviewed patient CT personally and did not appreciate significant hypoattenuation in the left basal ganglia but there is a focal hyperdensity in the posterior putamen on the right which is likely a perivascular space but may represent remote punctate infarct as well.   Will increase patient's antiplatelet and statin therapy per below for neurovascular prophylaxis and follow-up on further neuroimaging and stroke work-up per below.      RECOMMENDATIONS      TIA (Left Facial Numbness, Left Hemibody Weakness/Numbness)  Elevated BPs  Reported History of TIAs  -Q6Hr NeuroChecks, TELE  -Neurovascular Prophylaxis: , Atorvastatin 40  -SBP Goal < 160  -Glucose Goal < 180  -Head of Bed at 30 degrees  -VTE Prophylaxis: Enoxaparin 40 daily  -PT/OT/ST as needed  -Management of metabolic/infectious derangements to referring teams  -F/U MRI Brain WO, Carotid US, TTE, HgbA1c      Review of Systems  A 14 point review was done and pertinent positives & negative findings are listed as part of the history of present illness, all other systems were reviewed and are negative. Physical/Neurological Exam  Cardiovascular: tachycardic at times  Pulmonary: no increased work of breathing  Skin: warm and dry      Patient awake, alert; following central and peripheral commands   No expressive or receptive aphasia;  No dysarthria   Pupils react to light bilaterally; EOM Intact   No visual field deficits on gross exam   No facial droop   No gross hearing loss   Tongue is midline   Motor: 5/5 Throughout   No abnormal movements   Normal tone throughout   Sensation to light touch intact grossly throughout     NIHSS: 0      Past Medical History:   Diagnosis Date    Hyperlipidemia     Hypertension     Neuropathy     bilateral feet     Osteoporosis       Past Surgical History:   Procedure Laterality Date    HX COLONOSCOPY      due 2019 but due to COVID-19 will wait    HX HEENT      open up duct in eye     No Known Allergies  Family History   Problem Relation Age of Onset    Dementia Mother     Heart Disease Father         valve problem     Cancer Brother         Leukemia     Diabetes Brother     Heart Attack Brother         x3    Stroke Brother         x3        Social Connections:     Frequency of Communication with Friends and Family: Not on file    Frequency of Social Gatherings with Friends and Family: Not on file    Attends Jain Services: Not on file    Active Member of Clubs or Organizations: Not on file    Attends Club or Organization Meetings: Not on file    Marital Status: Not on file         Medications  Current Outpatient Medications   Medication Instructions    ascorbic acid (VITAMIN C PO) Oral    aspirin (ASPIR-81 PO) Oral    atorvastatin (LIPITOR) 20 mg, Oral, DAILY    latanoprost (XALATAN) 0.005 % ophthalmic solution 1 Drop, Both Eyes, EVERY BEDTIME    multivitamin (ONE A DAY) tablet 1 Tablet, Oral, DAILY    telmisartan-hydroCHLOROthiazide (MICARDIS HCT) 40-12.5 mg per tablet 1 Tablet, Oral, DAILY    vitamin e (E GEMS) 1,000 Units, Oral, DAILY       Current Facility-Administered Medications   Medication Dose Route Frequency    sodium chloride (NS) flush 5-40 mL  5-40 mL IntraVENous Q8H    sodium chloride (NS) flush 5-40 mL  5-40 mL IntraVENous PRN    acetaminophen (TYLENOL) tablet 650 mg  650 mg Oral Q6H PRN    Or    acetaminophen (TYLENOL) suppository 650 mg  650 mg Rectal Q6H PRN    polyethylene glycol (MIRALAX) packet 17 g  17 g Oral DAILY PRN    ondansetron (ZOFRAN ODT) tablet 4 mg  4 mg Oral Q8H PRN    Or    ondansetron (ZOFRAN) injection 4 mg  4 mg IntraVENous Q6H PRN    enoxaparin (LOVENOX) injection 40 mg  40 mg SubCUTAneous DAILY    . PHARMACY TO SUBSTITUTE PER PROTOCOL (Reordered from: ascorbic acid (VITAMIN C PO))    Per Protocol    . PHARMACY TO SUBSTITUTE PER PROTOCOL (Reordered from: aspirin (ASPIR-81 PO))    Per Protocol    atorvastatin (LIPITOR) tablet 20 mg  20 mg Oral DAILY    latanoprost (XALATAN) 0.005 % ophthalmic solution 1 Drop  1 Drop Both Eyes QHS    multivitamin with folic acid (ONE DAILY WITH FOLIC ACID) tablet 1 Tablet  1 Tablet Oral DAILY    . PHARMACY TO SUBSTITUTE PER PROTOCOL (Reordered from: telmisartan-hydroCHLOROthiazide (MICARDIS HCT) 40-12.5 mg per tablet)    Per Protocol    vitamin e (E GEMS) capsule 1,000 Units  1,000 Units Oral DAILY        Objective  Temp:  [97.8 °F (36.6 °C)-98 °F (36.7 °C)]   Pulse (Heart Rate):  [79-98]   BP: (150-194)/()   Resp Rate:  [15-18]   O2 Sat (%):  [94 %-99 %]   Weight:  [54.4 kg (120 lb)]   No intake or output data in the 24 hours ending 12/30/21 0659  Wt Readings from Last 3 Encounters:   12/29/21 54.4 kg (120 lb)   11/10/21 55.5 kg (122 lb 6.4 oz)   06/16/21 56.2 kg (123 lb 12.8 oz)        Labs  Recent Results (from the past 24 hour(s))   GLUCOSE, POC    Collection Time: 12/29/21  4:19 PM   Result Value Ref Range    Glucose (POC) 130 (H) 65 - 117 mg/dL    Performed by Enriqueta George    CBC WITH AUTOMATED DIFF    Collection Time: 12/29/21  4:45 PM   Result Value Ref Range    WBC 10.9 3.6 - 11.0 K/uL    RBC 3.98 3.80 - 5.20 M/uL    HGB 12.4 11.5 - 16.0 g/dL    HCT 36.0 35.0 - 47.0 %    MCV 90.5 80.0 - 99.0 FL    MCH 31.2 26.0 - 34.0 PG    MCHC 34.4 30.0 - 36.5 g/dL    RDW 12.7 11.5 - 14.5 %    PLATELET 623 (H) 941 - 400 K/uL    MPV 9.5 8.9 - 12.9 FL    NRBC 0.0 0.0  WBC    ABSOLUTE NRBC 0.00 0.00 - 0.01 K/uL    NEUTROPHILS 69 32 - 75 %    LYMPHOCYTES 22 12 - 49 %    MONOCYTES 7 5 - 13 %    EOSINOPHILS 1 0 - 7 %    BASOPHILS 0 0 - 1 %    IMMATURE GRANULOCYTES 1 (H) 0 - 0.5 %    ABS. NEUTROPHILS 7.6 1.8 - 8.0 K/UL    ABS. LYMPHOCYTES 2.4 0.8 - 3.5 K/UL    ABS. MONOCYTES 0.8 0.0 - 1.0 K/UL    ABS. EOSINOPHILS 0.1 0.0 - 0.4 K/UL    ABS. BASOPHILS 0.0 0.0 - 0.1 K/UL    ABS. IMM.  GRANS. 0.1 (H) 0.00 - 0.04 K/UL    DF AUTOMATED     METABOLIC PANEL, COMPREHENSIVE    Collection Time: 12/29/21  4:45 PM   Result Value Ref Range    Sodium 130 (L) 136 - 145 mmol/L    Potassium 3.9 3.5 - 5.1 mmol/L    Chloride 97 97 - 108 mmol/L    CO2 28 21 - 32 mmol/L    Anion gap 5 5 - 15 mmol/L    Glucose 119 (H) 65 - 100 mg/dL    BUN 11 6 - 20 mg/dL    Creatinine 0.67 0.55 - 1.02 mg/dL    BUN/Creatinine ratio 16 12 - 20      GFR est AA >60 >60 ml/min/1.73m2    GFR est non-AA >60 >60 ml/min/1.73m2    Calcium 9.1 8.5 - 10.1 mg/dL    Bilirubin, total 0.2 0.2 - 1.0 mg/dL    AST (SGOT) 17 15 - 37 U/L    ALT (SGPT) 21 12 - 78 U/L    Alk. phosphatase 79 45 - 117 U/L    Protein, total 7.1 6.4 - 8.2 g/dL    Albumin 3.9 3.5 - 5.0 g/dL    Globulin 3.2 2.0 - 4.0 g/dL    A-G Ratio 1.2 1.1 - 2.2     PROTHROMBIN TIME + INR    Collection Time: 12/29/21  4:45 PM   Result Value Ref Range    Prothrombin time 13.7 11.9 - 14.7 sec    INR 1.1 0.9 - 1.1     PTT    Collection Time: 12/29/21  4:45 PM   Result Value Ref Range    aPTT 32.3 21.2 - 34.1 sec    aPTT, therapeutic range   82 - 109 sec   TROPONIN-HIGH SENSITIVITY    Collection Time: 12/29/21  4:45 PM   Result Value Ref Range    Troponin-High Sensitivity 6 0 - 51 ng/L          Significant Diagnostic Studies  All images independently visualized    CT CODE NEURO HEAD WO CONTRAST   Final Result   Small hypodensity in the left basal ganglia which could be small   vessel ischemic disease or infarction, age indeterminate. No acute intracranial   hemorrhage. Other findings as above. Findings conveyed to Nora Pace, charge nurse on behalf of Dr. Marbin Palacios on 12/29/2021   at 4:57 PM.                  This document has been prepared by the Dragon voice recognition system, typographical errors may have occurred.  Attempts have been made to correct errors, however inadvertent errors may persist.

## 2021-12-30 NOTE — PROGRESS NOTES
Continue [facility] work-up.       Problem: Patient Education: Go to Patient Education Activity  Goal: Patient/Family Education  Outcome: Progressing Towards Goal     Problem: TIA/CVA Stroke: 0-24 hours  Goal: Off Pathway (Use only if patient is Off Pathway)  Outcome: Progressing Towards Goal  Goal: Activity/Safety  Outcome: Progressing Towards Goal  Goal: Consults, if ordered  Outcome: Progressing Towards Goal  Goal: Diagnostic Test/Procedures  Outcome: Progressing Towards Goal  Goal: Minimize risk of bleeding post-thrombolytic infusion  Outcome: Progressing Towards Goal  Goal: Monitor for complications post-thrombolytic infusion  Outcome: Progressing Towards Goal  Goal: Psychosocial  Outcome: Progressing Towards Goal

## 2021-12-31 LAB
EST. AVERAGE GLUCOSE BLD GHB EST-MCNC: 108 MG/DL
HBA1C MFR BLD: 5.4 % (ref 4–5.6)

## 2022-01-14 ENCOUNTER — OFFICE VISIT (OUTPATIENT)
Dept: FAMILY MEDICINE CLINIC | Age: 72
End: 2022-01-14
Payer: MEDICARE

## 2022-01-14 VITALS
HEART RATE: 77 BPM | SYSTOLIC BLOOD PRESSURE: 138 MMHG | WEIGHT: 123 LBS | DIASTOLIC BLOOD PRESSURE: 73 MMHG | BODY MASS INDEX: 21 KG/M2 | OXYGEN SATURATION: 97 % | HEIGHT: 64 IN | TEMPERATURE: 98 F | RESPIRATION RATE: 18 BRPM

## 2022-01-14 DIAGNOSIS — G45.9 TIA (TRANSIENT ISCHEMIC ATTACK): ICD-10-CM

## 2022-01-14 DIAGNOSIS — Z09 HOSPITAL DISCHARGE FOLLOW-UP: Primary | ICD-10-CM

## 2022-01-14 DIAGNOSIS — E78.2 MIXED HYPERLIPIDEMIA: ICD-10-CM

## 2022-01-14 PROCEDURE — G8420 CALC BMI NORM PARAMETERS: HCPCS | Performed by: NURSE PRACTITIONER

## 2022-01-14 PROCEDURE — G9899 SCRN MAM PERF RSLTS DOC: HCPCS | Performed by: NURSE PRACTITIONER

## 2022-01-14 PROCEDURE — G8754 DIAS BP LESS 90: HCPCS | Performed by: NURSE PRACTITIONER

## 2022-01-14 PROCEDURE — G8536 NO DOC ELDER MAL SCRN: HCPCS | Performed by: NURSE PRACTITIONER

## 2022-01-14 PROCEDURE — 1111F DSCHRG MED/CURRENT MED MERGE: CPT | Performed by: NURSE PRACTITIONER

## 2022-01-14 PROCEDURE — 1090F PRES/ABSN URINE INCON ASSESS: CPT | Performed by: NURSE PRACTITIONER

## 2022-01-14 PROCEDURE — 99214 OFFICE O/P EST MOD 30 MIN: CPT | Performed by: NURSE PRACTITIONER

## 2022-01-14 PROCEDURE — G8427 DOCREV CUR MEDS BY ELIG CLIN: HCPCS | Performed by: NURSE PRACTITIONER

## 2022-01-14 PROCEDURE — G8432 DEP SCR NOT DOC, RNG: HCPCS | Performed by: NURSE PRACTITIONER

## 2022-01-14 PROCEDURE — 3017F COLORECTAL CA SCREEN DOC REV: CPT | Performed by: NURSE PRACTITIONER

## 2022-01-14 PROCEDURE — G8752 SYS BP LESS 140: HCPCS | Performed by: NURSE PRACTITIONER

## 2022-01-14 PROCEDURE — 1101F PT FALLS ASSESS-DOCD LE1/YR: CPT | Performed by: NURSE PRACTITIONER

## 2022-01-14 RX ORDER — ATORVASTATIN CALCIUM 80 MG/1
80 TABLET, FILM COATED ORAL DAILY
Qty: 90 TABLET | Refills: 3 | Status: SHIPPED
Start: 2022-01-14 | End: 2022-07-27

## 2022-01-14 NOTE — PROGRESS NOTES
Chief Complaint   Patient presents with    Follow-up     \[facility] [place] [facility]\""tal follow up for TIA     Visit Vitals  /73 (BP 1 Location: Right arm, BP Patient Position: Sitting, BP Cuff Size: Adult)   Pulse 77   Temp 98 °F (36.7 °C) (Temporal)   Resp 18   Ht 5' 4\" (1.626 m)   Wt 123 lb (55.8 kg)   SpO2 97%   BMI 21.11 kg/m²     Subjective  Squire Sic is a 80 y.o. female. HPI:  Pt presented for f/u after hospitalization from [facility]. In hospital from 12/29/2021-12/30/2021. Pt is new to me. Dr. Adrian Adler has been her PCP. It has been more than 14 days since she was discharged from hospital.   Pt stated she was trying to get up from couch, dizziness, her feet felt like rubber. Numbness above her lip, no slurring, did not fall. She had some mild weakness on L side. Review of the D/C note indicated that pt's emergent CT head revealed possible hypoattenuation of the left basal ganglia. Did not have any acute intracranial hemorrhage. MRI of head also did not note any acute intracranial abnormalities or evidence for acute infarct. Was noted to  Have mild ischemic microvascular disease white matter. Last LDL was 90. Her discharge dose of atorvastatin was increased to 80 mg daily. Would like LDL < 70.      Patient Active Problem List   Diagnosis Code    Hypertension I10    Hyperlipidemia E78.5    Osteoporosis M81.0    TIA (transient ischemic attack) G45.9     Past Medical History:   Diagnosis Date    Hyperlipidemia     Hypertension     Neuropathy     bilateral feet     Osteoporosis      Past Surgical History:   Procedure Laterality Date    HX COLONOSCOPY      due 2019 but due to COVID-19 will wait    HX HEENT      open up duct in eye     Current Outpatient Medications   Medication Instructions    ascorbic acid (VITAMIN C PO) Oral    aspirin (ASPIR-81 PO) Oral    atorvastatin (LIPITOR) 80 mg, Oral, DAILY    latanoprost (XALATAN) 0.005 % ophthalmic solution 1 Drop, Both Eyes, EVERY BEDTIME    multivitamin (ONE A DAY) tablet 1 Tablet, Oral, DAILY    telmisartan-hydroCHLOROthiazide (MICARDIS HCT) 40-12.5 mg per tablet 1 Tablet, Oral, DAILY    vitamin e (E GEMS) 1,000 Units, Oral, DAILY     No Known Allergies  Social History     Tobacco Use    Smoking status: Former Smoker     Packs/day: 1.50     Years: 50.00     Pack years: 75.00     Types: Cigarettes     Quit date: 2020     Years since quittin.4    Smokeless tobacco: Never Used   Vaping Use    Vaping Use: Never used   Substance Use Topics    Alcohol use: Yes     Comment: special occassions     Drug use: Never     Family History   Problem Relation Age of Onset    Dementia Mother     Heart Disease Father         valve problem     Cancer Brother         Leukemia     Diabetes Brother     Heart Attack Brother         x3    Stroke Brother         x3       Review of Systems   Constitutional: Negative for chills, fever and malaise/fatigue. HENT: Negative for congestion, ear pain and sore throat. Respiratory: Negative for cough, shortness of breath and wheezing. Cardiovascular: Negative for chest pain, palpitations and leg swelling. Gastrointestinal: Negative. Negative for abdominal pain, constipation, heartburn, nausea and vomiting. Genitourinary: Positive for frequency. Negative for dysuria. Musculoskeletal: Negative for back pain, falls, joint pain, myalgias and neck pain. Skin: Negative for itching and rash. Neurological: Positive for tingling, sensory change and headaches. Negative for dizziness and weakness. Psychiatric/Behavioral: Negative for depression. The patient is not nervous/anxious and does not have insomnia. Objective  Physical Exam  Constitutional:       General: She is not in acute distress. Appearance: Normal appearance. HENT:      Head: Normocephalic.       Right Ear: External ear normal.      Left Ear: External ear normal.      Nose: Nose normal.   Eyes:      Conjunctiva/sclera: Conjunctivae normal.   Neck: Vascular: Carotid bruit present. Cardiovascular:      Rate and Rhythm: Normal rate and regular rhythm. Heart sounds: Normal heart sounds. No murmur heard. Pulmonary:      Effort: Pulmonary effort is normal. No respiratory distress. Breath sounds: Normal breath sounds. Musculoskeletal:         General: No swelling or tenderness. Normal range of motion. Cervical back: Neck supple. Right lower leg: No edema. Left lower leg: No edema. Skin:     General: Skin is warm and dry. Coloration: Skin is not jaundiced. Findings: No lesion or rash. Neurological:      Mental Status: She is alert and oriented to person, place, and time. Motor: No weakness. Gait: Gait normal.   Psychiatric:         Mood and Affect: Mood normal.         Behavior: Behavior normal.         Thought Content: Thought content normal.         Judgment: Judgment normal.       Assessment & Plan      ICD-10-CM ICD-9-CM    1. Hospital discharge follow-up  Z09 V67.59 NE DISCHARGE MEDS RECONCILED W/ CURRENT OUTPATIENT MED LIST   2. TIA (transient ischemic attack)  G45.9 435.9    3. Mixed hyperlipidemia  E78.2 272.2 atorvastatin (LIPITOR) 80 mg tablet       1. Hospital discharge follow-up    - NE DISCHARGE MEDS RECONCILED W/ CURRENT OUTPATIENT MED LIST    2. TIA (transient ischemic attack)  Symptoms resolved. Pt was advised to call EMS immediately if symptoms should return. 3. Mixed hyperlipidemia  Will recheck labs in 3-4 months for status. - atorvastatin (LIPITOR) 80 mg tablet; Take 1 Tablet by mouth daily. Indications: hardening of the arteries due to plaque buildup  Dispense: 90 Tablet; Refill: 3    I have discussed the diagnosis with the patient and the intended plan as seen in the above orders. Pt/caretaker has expressed understanding. Questions were answered concerning future plans. I have discussed medication side effects and warnings as indicated with the patient as well.     Martha Kraft NP Transitional Care Management Progress Note    Patient: Scott Mason  : 1950  PCP: Juan Alberto Ackerman DO    Date of office visit: 2022   Date of admission: 21  Date of discharge: 21  Hospital: [facility]    Call initiated w/i 2 business dates of discharge: *No response documented in the last 14 days   Date of the most recent call to the patient: *No documented post hospital discharge outreach found in the last 14 days      Assessment/Plan:   See encounter note     Subjective:   Scott Mason is a 80 y.o. female presenting today for follow-up after hospital discharge. This encounter and supporting documentation was reviewed if available. Medication reconciliation was performed today. The main problem requiring admission was TIA. Complications during admission: none      Interval history/Current status: Stable    Admitting symptoms have: improved      Medications marked \"taking\" at this time:  Prior to Admission medications    Medication Sig Start Date End Date Taking? Authorizing Provider   atorvastatin (LIPITOR) 80 mg tablet Take 1 Tablet by mouth daily. Indications: hardening of the arteries due to plaque buildup 22  Yes Candido Goltz, KATELYN   latanoprost (XALATAN) 0.005 % ophthalmic solution Administer 1 Drop to both eyes nightly. Yes Provider, Historical   telmisartan-hydroCHLOROthiazide (MICARDIS HCT) 40-12.5 mg per tablet Take 1 Tablet by mouth daily. Patient taking differently: Take 1 Tablet by mouth every evening. 10/25/21  Yes Juan Alberto Ackerman DO   vitamin e (E GEMS) 1,000 unit capsule Take 1,000 Units by mouth daily. Yes Provider, Historical   aspirin (ASPIR-81 PO) Take  by mouth. Yes Provider, Historical   ascorbic acid (VITAMIN C PO) Take  by mouth. Yes Provider, Historical   multivitamin (ONE A DAY) tablet Take 1 Tab by mouth daily.    Yes Provider, Historical        ROS: See encounter note      Objective:     Visit Vitals  /73 (BP 1 Location: Right arm, BP Patient Position: Sitting, BP Cuff Size: Adult)   Pulse 77   Temp 98 °F (36.7 °C) (Temporal)   Resp 18   Ht 5' 4\" (1.626 m)   Wt 123 lb (55.8 kg)   SpO2 97%   BMI 21.11 kg/m²        See encounter note    We discussed the expected course, resolution and complications of the diagnosis(es) in detail. Medication risks, benefits, costs, interactions, and alternatives were discussed as indicated. I advised her to contact the office if her condition worsens, changes or fails to improve as anticipated. She expressed understanding with the diagnosis(es) and plan.      Mariano Nick NP

## 2022-01-14 NOTE — PROGRESS NOTES
Visit Vitals  /73 (BP 1 Location: Right arm, BP Patient Position: Sitting, BP Cuff Size: Adult)   Pulse 77   Temp 98 °F (36.7 °C) (Temporal)   Resp 18   Ht 5' 4\" (1.626 m)   Wt 123 lb (55.8 kg)   SpO2 97%   BMI 21.11 kg/m²     Chief Complaint   Patient presents with    Follow-up     hopsital follow up for TIA

## 2022-03-19 PROBLEM — G45.9 TIA (TRANSIENT ISCHEMIC ATTACK): Status: ACTIVE | Noted: 2021-12-29

## 2022-04-25 DIAGNOSIS — I10 PRIMARY HYPERTENSION: Primary | ICD-10-CM

## 2022-04-26 ENCOUNTER — TELEPHONE (OUTPATIENT)
Dept: FAMILY MEDICINE CLINIC | Age: 72
End: 2022-04-26

## 2022-04-26 RX ORDER — TELMISARTAN AND HYDROCHLORTHIAZIDE 40; 12.5 MG/1; MG/1
1 TABLET ORAL EVERY EVENING
Qty: 90 TABLET | Refills: 1 | Status: SHIPPED | OUTPATIENT
Start: 2022-04-26 | End: 2022-07-14 | Stop reason: SDUPTHER

## 2022-07-14 DIAGNOSIS — I10 PRIMARY HYPERTENSION: ICD-10-CM

## 2022-07-14 NOTE — TELEPHONE ENCOUNTER
Patient needs a refill for Micardis HCT 40-12.5 mg tabs patient had an appt but rescheduled to another day 7- @ 3:30 pm

## 2022-07-15 RX ORDER — TELMISARTAN AND HYDROCHLORTHIAZIDE 40; 12.5 MG/1; MG/1
1 TABLET ORAL EVERY EVENING
Qty: 90 TABLET | Refills: 1 | Status: SHIPPED
Start: 2022-07-15 | End: 2022-09-13 | Stop reason: ALTCHOICE

## 2022-07-27 ENCOUNTER — OFFICE VISIT (OUTPATIENT)
Dept: FAMILY MEDICINE CLINIC | Age: 72
End: 2022-07-27
Payer: MEDICARE

## 2022-07-27 VITALS
OXYGEN SATURATION: 97 % | TEMPERATURE: 98.7 F | RESPIRATION RATE: 20 BRPM | HEIGHT: 64 IN | DIASTOLIC BLOOD PRESSURE: 77 MMHG | WEIGHT: 123 LBS | BODY MASS INDEX: 21 KG/M2 | HEART RATE: 75 BPM | SYSTOLIC BLOOD PRESSURE: 152 MMHG

## 2022-07-27 DIAGNOSIS — I10 PRIMARY HYPERTENSION: Primary | ICD-10-CM

## 2022-07-27 DIAGNOSIS — G62.9 NEUROPATHY: ICD-10-CM

## 2022-07-27 DIAGNOSIS — E78.2 MIXED HYPERLIPIDEMIA: ICD-10-CM

## 2022-07-27 DIAGNOSIS — Z11.59 NEED FOR HEPATITIS C SCREENING TEST: ICD-10-CM

## 2022-07-27 DIAGNOSIS — D64.9 ANEMIA, UNSPECIFIED TYPE: ICD-10-CM

## 2022-07-27 PROCEDURE — G8536 NO DOC ELDER MAL SCRN: HCPCS | Performed by: FAMILY MEDICINE

## 2022-07-27 PROCEDURE — 3017F COLORECTAL CA SCREEN DOC REV: CPT | Performed by: FAMILY MEDICINE

## 2022-07-27 PROCEDURE — G8427 DOCREV CUR MEDS BY ELIG CLIN: HCPCS | Performed by: FAMILY MEDICINE

## 2022-07-27 PROCEDURE — 1123F ACP DISCUSS/DSCN MKR DOCD: CPT | Performed by: FAMILY MEDICINE

## 2022-07-27 PROCEDURE — 99214 OFFICE O/P EST MOD 30 MIN: CPT | Performed by: FAMILY MEDICINE

## 2022-07-27 PROCEDURE — 1101F PT FALLS ASSESS-DOCD LE1/YR: CPT | Performed by: FAMILY MEDICINE

## 2022-07-27 PROCEDURE — G8420 CALC BMI NORM PARAMETERS: HCPCS | Performed by: FAMILY MEDICINE

## 2022-07-27 PROCEDURE — 1090F PRES/ABSN URINE INCON ASSESS: CPT | Performed by: FAMILY MEDICINE

## 2022-07-27 PROCEDURE — G9899 SCRN MAM PERF RSLTS DOC: HCPCS | Performed by: FAMILY MEDICINE

## 2022-07-27 PROCEDURE — G8754 DIAS BP LESS 90: HCPCS | Performed by: FAMILY MEDICINE

## 2022-07-27 PROCEDURE — G8753 SYS BP > OR = 140: HCPCS | Performed by: FAMILY MEDICINE

## 2022-07-27 PROCEDURE — G8510 SCR DEP NEG, NO PLAN REQD: HCPCS | Performed by: FAMILY MEDICINE

## 2022-07-27 NOTE — PROGRESS NOTES
Family Medicine Clinic Note    Assessment/ Plan:   Diagnoses and all orders for this visit:    1. Primary hypertension  -     METABOLIC PANEL, COMPREHENSIVE  -     CBC WITH AUTOMATED DIFF  -     LIPID PANEL    2. Mixed hyperlipidemia  -     LIPID PANEL    3. Anemia, unspecified type  -     FERRITIN  -     IRON PROFILE  -     CBC WITH AUTOMATED DIFF    4. Neuropathy    5. Need for hepatitis C screening test  -     HEPATITIS C AB    Hypertension, uncontrolled but today's value is outlier elevated from baseline. Recheck in 2 weeks. Continue current regimen. Low-salt diet advised including avoidance of AHA salty 6 foods. Hyperlipidemia per chart review. Patient not taking atorvastatin since 2/2022. Per neurologist note from 2/2022 patient had transient neurologic symptoms thought to be secondary to her chronic neuropathy but not consistent with a neurovascular event. Neurologist Dr. Karly Seay MD states patient did not have stroke or TIA and atorvastatin could be discontinued. Follow-up with neurologist per routine for neuropathy management. Deleted TIA from problem list after review of neurologist note. Anemia, noted on last labs. This was an outlier value although noted hemoglobin baseline is low normal.  Recheck today with iron panel to clarify etiology. Hyponatremia, chronic, likely side effect of blood pressure medication. Monitor today. If sodium remains low then consider discontinuation of HCTZ and change to calcium channel blocker or increase to telmisartan alone 80 mg daily. Hepatitis C test due and ordered. Educated patient on red flag symptoms to warrant return to clinic or emergency room visit. I have discussed the diagnosis with the patient and the intended plan as seen in the above orders. The patient has been offered or received an after-visit summary and questions were answered concerning future plans.   I have discussed medication side effects and warnings with the patient as well. Follow-up and Dispositions    Return in about 2 weeks (around 8/10/2022) for Nurse Blood Pressure check then 3 month with PCP for blood pressure. Subjective:     Chief Complaint   Patient presents with    Follow-up    TIA    Hypertension    Cholesterol Problem     Green Factor is a 80y.o. year old female who presents for evaluation of the following:      Hypertension/Hyperlipidemia:  Home monitoring: none  Diet: Not adhering to strict low salt diet  Treatment: Off atorvastatin since 2/2022 at recommend of her neurologist  Denies palpitations, chest pain, shortness of breath, new leg swelling, weakness in extremities  The ASCVD Risk score (Zora Krishna et al., 2013) failed to calculate for the following reasons: The patient has a prior MI or stroke diagnosis  Key CAD CHF Meds               telmisartan-hydroCHLOROthiazide (MICARDIS HCT) 40-12.5 mg per tablet (Taking) Take 1 Tablet by mouth every evening. aspirin (ASPIR-81 PO) (Taking) Take  by mouth. Anemia  Lab Results   Component Value Date/Time    HGB 11.2 (L) 12/30/2021 07:32 AM       Hyponatremia            Review of Systems   Pertinent positives and negative per HPI. All other systems  reviewed are negative for a Comprehensive ROS (10+). Past medical history, social history, family history reviewed. Medications reconciled. Objective:     Vitals:    07/27/22 1526 07/27/22 1528   BP: (!) 163/79 (!) 152/77   Pulse: 75    Resp: 20    Temp: 98.7 °F (37.1 °C)    TempSrc: Temporal    SpO2: 97%    Weight: 123 lb (55.8 kg)    Height: 5' 4\" (1.626 m)        Physical Examination:  General: Alert, cooperative, no distress, appears stated age. Eyes: Conjunctivae clear. Ears: Normal external ear canals both ears. Nose: Nares normal. Septum midline. Mucosa normal.   Mouth/Throat: Lips, mucosa, and tongue normal. No oropharyngeal erythema. Neck: Supple, symmetrical, trachea midline, no adenopathy.  No thyroid enlargement/tenderness/nodules  Respiratory: Breathing comfortably, in no acute respiratory distress. Clear to auscultation bilaterally. Normal inspiratory and expiratory ratio. Cardiovascular: Regular rate and rhythm, S1, S2 normal, no murmur, click, rub or gallop.   -Extremities no edema. Pulses 2+ and symmetric radial   Abdomen: Soft, not distended. Bowel sounds normal. Non tender. MSK: Extremities normal appearing, atraumatic, no effusion. Gait steady and unassisted. Skin: Skin color, texture, turgor normal. No rashes or lesions on exposed skin. Lymph nodes: Cervical, supraclavicular nodes normal.  Neurologic: Cranial nerves II-XII intact. Strength 5/5 grossly. Sensation to sharp touch intact on bilateral lower arms, hands, lateral lower legs. Patellar reflexes 2+ bilaterally. Brachioradialis and brachial reflexes 2+ bilaterally. Finger-to-nose test normal.  Strength 5 out of 5 bilateral biceps, bilateral triceps, bilateral  strength, bilateral hip flexors, bilateral knee extension, bilateral knee flexion, bilateral foot dorsiflexion. Psychiatric: Affect appropriate. Thoughts logical. Speech volume and speed normal        Voice Recognition Transcription Disclaimer  This note is the work product of voice recognition technology. As a result, there may be spelling, grammar, punctuation errors, or unwanted word substitutions which may alter significantly the meaning of portions or all of this document. Sometimes notes are changed as errors are identified in order to catch these errors and make corrections in a timely fashion. Even though the notes are signed off, there may still be unrecognized errors as a result of this process. If, during the course of reviewing this document, you find errors or have questions, please do not hesitate to contact me for clarification.       Signed,    Garcia Tavares MD  7/27/2022

## 2022-07-27 NOTE — PROGRESS NOTES
Chief Complaint   Patient presents with    Follow-up    TIA    Hypertension    Cholesterol Problem       Visit Vitals  BP (!) 152/77 (BP 1 Location: Right upper arm, BP Patient Position: Sitting, BP Cuff Size: Adult)   Pulse 75   Temp 98.7 °F (37.1 °C) (Temporal)   Resp 20   Ht 5' 4\" (1.626 m)   Wt 123 lb (55.8 kg)   SpO2 97%   BMI 21.11 kg/m²       1. \"Have you been to the ER, urgent care clinic since your last visit? Hospitalized since your last visit? \" No    2. \"Have you seen or consulted any other health care providers outside of the [address] [place] [address] since your last visit? \"  Luane Poncho Dr. Bernhard Oppenheim      3. For patients aged 39-70: Has the patient had a colonoscopy / FIT/ Cologuard? No      If the patient is female:    4. For patients aged 41-77: Has the patient had a mammogram within the past 2 years? No      5. For patients aged 21-65: Has the patient had a pap smear?  NA - based on age or sex

## 2022-07-27 NOTE — PATIENT INSTRUCTIONS
DASH Diet: Care Instructions  Your Care Instructions     The DASH diet is an eating plan that can help lower your blood pressure. DASH stands for Dietary Approaches to Stop Hypertension. Hypertension is high blood pressure. The DASH diet focuses on eating foods that are high in calcium, potassium, and magnesium. These nutrients can lower blood pressure. The foods that are highest in these nutrients are fruits, vegetables, low-fat dairy products, nuts, seeds, and legumes. But taking calcium, potassium, and magnesium supplements instead of eating foods that are high in those nutrients does not have the same effect. The DASH diet also includes whole grains, fish, and poultry. The DASH diet is one of several lifestyle changes your doctor may recommend to lower your high blood pressure. Your doctor may also want you to decrease the amount of sodium in your diet. Lowering sodium while following the DASH diet can lower blood pressure even further than just the DASH diet alone. Follow-up care is a key part of your treatment and safety. Be sure to make and go to all appointments, and call your doctor if you are having problems. It's also a good idea to know your test results and keep a list of the medicines you take. How can you care for yourself at home? Following the DASH diet  Eat 4 to 5 servings of fruit each day. A serving is 1 medium-sized piece of fruit, ½ cup chopped or canned fruit, 1/4 cup dried fruit, or 4 ounces (½ cup) of fruit juice. Choose fruit more often than fruit juice. Eat 4 to 5 servings of vegetables each day. A serving is 1 cup of lettuce or raw leafy vegetables, ½ cup of chopped or cooked vegetables, or 4 ounces (½ cup) of vegetable juice. Choose vegetables more often than vegetable juice. Get 2 to 3 servings of low-fat and fat-free dairy each day. A serving is 8 ounces of milk, 1 cup of yogurt, or 1 ½ ounces of cheese. Eat 6 to 8 servings of grains each day.  A serving is 1 slice of bread, 1 ounce of dry cereal, or ½ cup of cooked rice, pasta, or cooked cereal. Try to choose whole-grain products as much as possible. Limit lean meat, poultry, and fish to 2 servings each day. A serving is 3 ounces, about the size of a deck of cards. Eat 4 to 5 servings of nuts, seeds, and legumes (cooked dried beans, lentils, and split peas) each week. A serving is 1/3 cup of nuts, 2 tablespoons of seeds, or ½ cup of cooked beans or peas. Limit fats and oils to 2 to 3 servings each day. A serving is 1 teaspoon of vegetable oil or 2 tablespoons of salad dressing. Limit sweets and added sugars to 5 servings or less a week. A serving is 1 tablespoon jelly or jam, ½ cup sorbet, or 1 cup of lemonade. Eat less than 2,300 milligrams (mg) of sodium a day. If you limit your sodium to 1,500 mg a day, you can lower your blood pressure even more. Be aware that all of these are the suggested number of servings for people who eat 1,800 to 2,000 calories a day. Your recommended number of servings may be different if you need more or fewer calories. Tips for success  Start small. Do not try to make dramatic changes to your diet all at once. You might feel that you are missing out on your favorite foods and then be more likely to not follow the plan. Make small changes, and stick with them. Once those changes become habit, add a few more changes. Try some of the following:  Make it a goal to eat a fruit or vegetable at every meal and at snacks. This will make it easy to get the recommended amount of fruits and vegetables each day. Try yogurt topped with fruit and nuts for a snack or healthy dessert. Add lettuce, tomato, cucumber, and onion to sandwiches. Combine a ready-made pizza crust with low-fat mozzarella cheese and lots of vegetable toppings. Try using tomatoes, squash, spinach, broccoli, carrots, cauliflower, and onions.   Have a variety of cut-up vegetables with a low-fat dip as an appetizer instead of chips and dip.  Sprinkle sunflower seeds or chopped almonds over salads. Or try adding chopped walnuts or almonds to cooked vegetables. Try some vegetarian meals using beans and peas. Add garbanzo or kidney beans to salads. Make burritos and tacos with mashed [place] beans or black beans. Where can you learn more? Go to http://www.man.com/  Enter H967 in the search box to learn more about \"DASH Diet: Care Instructions. \"  Current as of: January 10, 2022               Content Version: 13.2  © 9503-4781 [employer]. Care instructions adapted under license by [employer] (which disclaims liability or warranty for this information). If you have questions about a medical condition or this instruction, always ask your healthcare professional. iDego Kemp any warranty or liability for your use of this information.

## 2022-07-29 LAB
ALBUMIN SERPL-MCNC: 4.7 G/DL (ref 3.8–4.8)
ALBUMIN/GLOB SERPL: 2.6 {RATIO} (ref 1.2–2.2)
ALP SERPL-CCNC: 64 IU/L (ref 44–121)
ALT SERPL-CCNC: 10 IU/L (ref 0–32)
AST SERPL-CCNC: 16 IU/L (ref 0–40)
BASOPHILS # BLD AUTO: 0 X10E3/UL (ref 0–0.2)
BASOPHILS NFR BLD AUTO: 1 %
BILIRUB SERPL-MCNC: 0.3 MG/DL (ref 0–1.2)
BUN SERPL-MCNC: 14 MG/DL (ref 8–27)
BUN/CREAT SERPL: 25 (ref 12–28)
CALCIUM SERPL-MCNC: 9.2 MG/DL (ref 8.7–10.3)
CHLORIDE SERPL-SCNC: 93 MMOL/L (ref 96–106)
CHOLEST SERPL-MCNC: 269 MG/DL (ref 100–199)
CO2 SERPL-SCNC: 23 MMOL/L (ref 20–29)
CREAT SERPL-MCNC: 0.57 MG/DL (ref 0.57–1)
EGFR: 98 ML/MIN/1.73
EOSINOPHIL # BLD AUTO: 0.1 X10E3/UL (ref 0–0.4)
EOSINOPHIL NFR BLD AUTO: 2 %
ERYTHROCYTE [DISTWIDTH] IN BLOOD BY AUTOMATED COUNT: 12.1 % (ref 11.7–15.4)
FERRITIN SERPL-MCNC: 31 NG/ML (ref 15–150)
GLOBULIN SER CALC-MCNC: 1.8 G/DL (ref 1.5–4.5)
GLUCOSE SERPL-MCNC: 89 MG/DL (ref 65–99)
HCT VFR BLD AUTO: 38.3 % (ref 34–46.6)
HCV AB S/CO SERPL IA: 0.1 S/CO RATIO (ref 0–0.9)
HDLC SERPL-MCNC: 84 MG/DL
HGB BLD-MCNC: 12.5 G/DL (ref 11.1–15.9)
IMM GRANULOCYTES # BLD AUTO: 0 X10E3/UL (ref 0–0.1)
IMM GRANULOCYTES NFR BLD AUTO: 0 %
IRON SATN MFR SERPL: 26 % (ref 15–55)
IRON SERPL-MCNC: 99 UG/DL (ref 27–139)
LDLC SERPL CALC-MCNC: 169 MG/DL (ref 0–99)
LYMPHOCYTES # BLD AUTO: 1.9 X10E3/UL (ref 0.7–3.1)
LYMPHOCYTES NFR BLD AUTO: 30 %
MCH RBC QN AUTO: 30.8 PG (ref 26.6–33)
MCHC RBC AUTO-ENTMCNC: 32.6 G/DL (ref 31.5–35.7)
MCV RBC AUTO: 94 FL (ref 79–97)
MONOCYTES # BLD AUTO: 0.5 X10E3/UL (ref 0.1–0.9)
MONOCYTES NFR BLD AUTO: 8 %
NEUTROPHILS # BLD AUTO: 3.8 X10E3/UL (ref 1.4–7)
NEUTROPHILS NFR BLD AUTO: 59 %
PLATELET # BLD AUTO: 373 X10E3/UL (ref 150–450)
POTASSIUM SERPL-SCNC: 4.4 MMOL/L (ref 3.5–5.2)
PROT SERPL-MCNC: 6.5 G/DL (ref 6–8.5)
RBC # BLD AUTO: 4.06 X10E6/UL (ref 3.77–5.28)
SODIUM SERPL-SCNC: 134 MMOL/L (ref 134–144)
TIBC SERPL-MCNC: 377 UG/DL (ref 250–450)
TRIGL SERPL-MCNC: 97 MG/DL (ref 0–149)
UIBC SERPL-MCNC: 278 UG/DL (ref 118–369)
VLDLC SERPL CALC-MCNC: 16 MG/DL (ref 5–40)
WBC # BLD AUTO: 6.3 X10E3/UL (ref 3.4–10.8)

## 2022-08-02 RX ORDER — ATORVASTATIN CALCIUM 40 MG/1
40 TABLET, FILM COATED ORAL DAILY
Qty: 90 TABLET | Refills: 1 | Status: SHIPPED | OUTPATIENT
Start: 2022-08-02

## 2022-08-02 NOTE — PROGRESS NOTES
Reviewed results via telephone. Most results trending towards normal including hemoglobin and sodium. Cholesterol elevated during trial off atorvastatin. Noted patient stopped this because her neurologist told her she did not have a TIA or stroke which was apparently diagnosed at a hospital visit 12/2021. Currently her 10-year ASCVD risk is 17%. I advised patient restart atorvastatin 40 mg daily to increased ASCVD risk then recheck cholesterol at office visit with PCP in 3 months. Patient expressed understanding and agreement with plan.

## 2022-08-04 ENCOUNTER — TRANSCRIBE ORDER (OUTPATIENT)
Dept: SCHEDULING | Age: 72
End: 2022-08-04

## 2022-08-04 DIAGNOSIS — I65.29 CAROTID ARTERY STENOSIS: Primary | ICD-10-CM

## 2022-08-11 ENCOUNTER — TELEPHONE (OUTPATIENT)
Dept: FAMILY MEDICINE CLINIC | Age: 72
End: 2022-08-11

## 2022-08-11 NOTE — TELEPHONE ENCOUNTER
Called patient and left voice message stating overdue for annual wellness visit. Please call office to schedule an appointment.

## 2022-08-18 ENCOUNTER — HOSPITAL ENCOUNTER (OUTPATIENT)
Dept: CT IMAGING | Age: 72
Discharge: HOME | End: 2022-08-18
Attending: PSYCHIATRY & NEUROLOGY
Payer: MEDICARE

## 2022-08-18 DIAGNOSIS — I65.29 CAROTID ARTERY STENOSIS: ICD-10-CM

## 2022-08-18 PROCEDURE — 74011000636 HC RX REV CODE- 636: Performed by: PSYCHIATRY & NEUROLOGY

## 2022-08-18 PROCEDURE — 70498 CT ANGIOGRAPHY NECK: CPT

## 2022-08-18 RX ADMIN — IOPAMIDOL 100 ML: 755 INJECTION, SOLUTION INTRAVENOUS at 14:39

## 2022-09-13 ENCOUNTER — OFFICE VISIT (OUTPATIENT)
Dept: FAMILY MEDICINE CLINIC | Age: 72
End: 2022-09-13
Payer: MEDICARE

## 2022-09-13 VITALS
HEART RATE: 81 BPM | WEIGHT: 123.4 LBS | BODY MASS INDEX: 21.07 KG/M2 | HEIGHT: 64 IN | DIASTOLIC BLOOD PRESSURE: 68 MMHG | RESPIRATION RATE: 15 BRPM | SYSTOLIC BLOOD PRESSURE: 149 MMHG | TEMPERATURE: 97.8 F | OXYGEN SATURATION: 98 %

## 2022-09-13 DIAGNOSIS — G60.9 IDIOPATHIC PERIPHERAL NEUROPATHY: ICD-10-CM

## 2022-09-13 DIAGNOSIS — L60.8 TOENAIL DEFORMITY: ICD-10-CM

## 2022-09-13 DIAGNOSIS — J43.8 OTHER EMPHYSEMA (HCC): ICD-10-CM

## 2022-09-13 DIAGNOSIS — H61.23 BILATERAL IMPACTED CERUMEN: ICD-10-CM

## 2022-09-13 DIAGNOSIS — Z87.891 PERSONAL HISTORY OF TOBACCO USE, PRESENTING HAZARDS TO HEALTH: ICD-10-CM

## 2022-09-13 DIAGNOSIS — I10 PRIMARY HYPERTENSION: Primary | ICD-10-CM

## 2022-09-13 DIAGNOSIS — R30.0 DYSURIA: ICD-10-CM

## 2022-09-13 PROCEDURE — 1101F PT FALLS ASSESS-DOCD LE1/YR: CPT | Performed by: NURSE PRACTITIONER

## 2022-09-13 PROCEDURE — 1090F PRES/ABSN URINE INCON ASSESS: CPT | Performed by: NURSE PRACTITIONER

## 2022-09-13 PROCEDURE — G8536 NO DOC ELDER MAL SCRN: HCPCS | Performed by: NURSE PRACTITIONER

## 2022-09-13 PROCEDURE — 3017F COLORECTAL CA SCREEN DOC REV: CPT | Performed by: NURSE PRACTITIONER

## 2022-09-13 PROCEDURE — G8427 DOCREV CUR MEDS BY ELIG CLIN: HCPCS | Performed by: NURSE PRACTITIONER

## 2022-09-13 PROCEDURE — 99214 OFFICE O/P EST MOD 30 MIN: CPT | Performed by: NURSE PRACTITIONER

## 2022-09-13 PROCEDURE — G9899 SCRN MAM PERF RSLTS DOC: HCPCS | Performed by: NURSE PRACTITIONER

## 2022-09-13 PROCEDURE — G8753 SYS BP > OR = 140: HCPCS | Performed by: NURSE PRACTITIONER

## 2022-09-13 PROCEDURE — G8420 CALC BMI NORM PARAMETERS: HCPCS | Performed by: NURSE PRACTITIONER

## 2022-09-13 PROCEDURE — G8432 DEP SCR NOT DOC, RNG: HCPCS | Performed by: NURSE PRACTITIONER

## 2022-09-13 PROCEDURE — 1123F ACP DISCUSS/DSCN MKR DOCD: CPT | Performed by: NURSE PRACTITIONER

## 2022-09-13 PROCEDURE — G8754 DIAS BP LESS 90: HCPCS | Performed by: NURSE PRACTITIONER

## 2022-09-13 RX ORDER — TELMISARTAN 80 MG/1
80 TABLET ORAL DAILY
Qty: 90 TABLET | Refills: 1 | Status: SHIPPED | OUTPATIENT
Start: 2022-09-13

## 2022-09-13 RX ORDER — AMLODIPINE BESYLATE 5 MG/1
5 TABLET ORAL DAILY
Qty: 90 TABLET | Refills: 1 | Status: SHIPPED | OUTPATIENT
Start: 2022-09-13

## 2022-09-13 NOTE — PROGRESS NOTES
Chief Complaint   Patient presents with    Physical     Visit Vitals  BP (!) 149/68 (BP 1 Location: Left upper arm, BP Patient Position: Sitting, BP Cuff Size: Adult)   Pulse 81   Temp 97.8 °F (36.6 °C) (Temporal)   Resp 15   Ht 5' 4\" (1.626 m)   Wt 123 lb 6.4 oz (56 kg)   SpO2 98%   BMI 21.18 kg/m²     Andrei Osborn is a 80 y.o. female. HPI  Pt presented for PE. Last office visit w/ me was 1/14/2022 for hospital follow up and to establish w/ me. She had an appt w/ Dr. Angely Guallpa on 7/27/2022. Her B/P was elevated @152/62 @ that appt and pt was told to have it rechecked in 2 weeks which apparently she did not do. B/P in office today 149/68. Current med regime Micardis HCT 40-12. 5. Dr. Angely Guallpa ordered labs for pt which she notified pt of. Her cholesterol level was high.  mg/dL. She was on a trial off atorvastatin. Pt stopped because because her neurologist Dr. Pal Hernadez told her she did not have a TIA or stroke which was apparently diagnosed at a hospital visit 12/2021. Dr. Angely Guallpa noted that her estimated 10-year ASCVD risk @17%. so she advised pt to restart the atorvastatin which pt did. Her labs can be rechecked in 3-4 months for efficacy and need for med adjustment. Pt reported today that she has numbness in both feet which comes and goes. No pain but the numbness continues. Pt had a follow up appt w/ Dr. Neftali Hernández on 8/3/2022 at which time per encounter note the peripheral neuropathy symptoms were resolved. At 8/3/2022 appt Dr. Neftali Hernández ordered a CTA of neck w/wo contrast which was done on 8/4/2022. Clinical hx of carotid stenosis was noted. Findings as follows:  Impression:  Patient motion during the scan limiting evaluation of the carotid  bifurcations bilaterally. No significant stenosis of the right internal carotid  artery but the left internal carotid artery cannot be accurately measured. 2.  No intracranial large vessel occlusion or intracranial aneurysm.   3.  Severe emphysema. 4.  Occluded left vertebral artery    Pt stated that sometimes she can't find the words to say what she wants. This has been going on for last month and 1/2. Pt quit smoking about 2 yrs ago. She has a 75 pack year hx. Has never had an LDCT for screening. Discussed what the screening is and the rationale. Informed pt it would be most advantageous if she continues to have annual screenings up to age 68. She also has c/o of dysuria which has been going on for a few days. She has a misshapen L big toe nail- sometimes pain w/ walking and w/ certain shoes.  Has not seen a podiatrist.     Patient Active Problem List   Diagnosis Code    Hypertension I10    Hyperlipidemia E78.5    Osteoporosis M81.0    Other emphysema ([address] J43.8     Past Medical History:   Diagnosis Date    Hyperlipidemia     Hypertension     Neuropathy     bilateral feet     Osteoporosis      Past Surgical History:   Procedure Laterality Date    HX COLONOSCOPY      due 2019 but due to COVID-19 will wait    HX HEENT      open up duct in eye     Current Outpatient Medications   Medication Instructions    amLODIPine (NORVASC) 5 mg, Oral, DAILY    aspirin (ASPIR-81 PO) Oral    atorvastatin (LIPITOR) 40 mg, Oral, DAILY    latanoprost (XALATAN) 0.005 % ophthalmic solution 1 Drop, Both Eyes, EVERY BEDTIME    multivitamin (ONE A DAY) tablet 1 Tablet, Oral, DAILY    telmisartan (MICARDIS) 80 mg, Oral, DAILY    vitamin e (E GEMS) 1,000 Units, Oral, DAILY     No Known Allergies  Social History     Tobacco Use    Smoking status: Former     Packs/day: 1.50     Years: 50.00     Pack years: 75.00     Types: Cigarettes     Quit date: 2020     Years since quittin.0    Smokeless tobacco: Never   Vaping Use    Vaping Use: Never used   Substance Use Topics    Alcohol use: Yes     Comment: special occassions     Drug use: Never     Family History   Problem Relation Age of Onset    Dementia Mother     Heart Disease Father         valve problem     Cancer Brother         Leukemia     Diabetes Brother     Heart Attack Brother         x3    Stroke Brother         x3     Review of Systems   Constitutional:  Negative for chills, fever and malaise/fatigue. HENT:  Negative for congestion, ear pain and sore throat. Eyes:  Negative for blurred vision. Respiratory:  Negative for cough, shortness of breath and wheezing. Cardiovascular:  Negative for chest pain, palpitations and leg swelling. Gastrointestinal:  Negative for abdominal pain, constipation, diarrhea, heartburn, nausea and vomiting. Genitourinary:  Positive for dysuria. Musculoskeletal:  Negative for back pain, falls, joint pain, myalgias and neck pain. Neurological:  Positive for sensory change and headaches. Negative for dizziness, tingling and weakness. Numbness in both feet which comes and goes. No pain but the numbness continues. Gets a nagging headache that comes on and take ibuprofen and tylenol but not effective. Psychiatric/Behavioral:  Negative for depression. The patient is not nervous/anxious and does not have insomnia. Can't find words for what she is trying to say. Objective  Physical Exam  Vitals reviewed. Constitutional:       General: She is not in acute distress. Appearance: Normal appearance. She is normal weight. HENT:      Head: Normocephalic. Right Ear: Ear canal and external ear normal. There is impacted cerumen. Left Ear: Ear canal and external ear normal. There is impacted cerumen. Nose: Nose normal.      Mouth/Throat:      Mouth: Mucous membranes are moist.      Pharynx: Oropharynx is clear. Eyes:      Extraocular Movements: Extraocular movements intact. Conjunctiva/sclera: Conjunctivae normal.      Pupils: Pupils are equal, round, and reactive to light. Cardiovascular:      Rate and Rhythm: Normal rate and regular rhythm. Pulses: Normal pulses. Heart sounds: Normal heart sounds.  No murmur heard.  Pulmonary:      Effort: Pulmonary effort is normal. No respiratory distress. Breath sounds: Normal breath sounds. Abdominal:      General: Abdomen is flat. Bowel sounds are normal.      Palpations: Abdomen is soft. There is no mass. Tenderness: There is no abdominal tenderness. There is no right CVA tenderness or left CVA tenderness. Hernia: No hernia is present. Musculoskeletal:         General: No swelling or tenderness. Normal range of motion. Cervical back: Neck supple. Right lower leg: No edema. Left lower leg: No edema. Skin:     General: Skin is warm and dry. Coloration: Skin is not jaundiced. Findings: No lesion or rash. Comments: misshapen great toe nail on L foot   Neurological:      General: No focal deficit present. Mental Status: She is alert and oriented to person, place, and time. Sensory: No sensory deficit. Motor: No weakness. Coordination: Coordination normal.      Gait: Gait normal.      Deep Tendon Reflexes: Reflexes normal.   Psychiatric:         Mood and Affect: Mood normal.         Behavior: Behavior normal.         Thought Content: Thought content normal.         Judgment: Judgment normal.       Assessment & Plan      ICD-10-CM ICD-9-CM    1. Primary hypertension  I10 401.9 telmisartan (MICARDIS) 80 mg tablet      amLODIPine (NORVASC) 5 mg tablet      2. Dysuria  R30.0 788.1 AMB POC URINALYSIS DIP STICK AUTO W/ MICRO       CANCELED: AMB POC URINALYSIS DIP STICK AUTO W/O MICRO      3. Toenail deformity  L60.8 703.9 REFERRAL TO PODIATRY      4. Bilateral impacted cerumen  H61.23 380.4 REFERRAL TO ENT-OTOLARYNGOLOGY      5. Other emphysema (HCC)  J43.8 492.8       6. Personal history of tobacco use, presenting hazards to health  Z87.891 V15.82 CT LOW DOSE LUNG CANCER SCREENING          1. Primary hypertension  Discussed desired B/P goal < 130/80 and adding on another B/P med. Pt was agreeable.  Advised to continue to check her B/P daily. - telmisartan (MICARDIS) 80 mg tablet; Take 1 Tablet by mouth daily. Indications: high blood pressure  Dispense: 90 Tablet; Refill: 1  - amLODIPine (NORVASC) 5 mg tablet; Take 1 Tablet by mouth daily. Dispense: 90 Tablet; Refill: 1    2. Dysuria  Dipstick did not indicate UTI. Pt advised to increase her hydration.    - AMB POC URINALYSIS DIP STICK AUTO W/ MICRO     3. Toenail deformity  Pt was agreeable to referral. Advised to call podiatry office asap for an appt. - REFERRAL TO PODIATRY    4. Idiopathic peripheral neuropathy  Has not resolved. Continue to monitor. 5. Bilateral impacted cerumen  Pt as agreeable to referral to ENT for cerumen removal and ear exam. Advised to call for an appt asap. - REFERRAL TO ENT-OTOLARYNGOLOGY    6. Other emphysema ([address]  Pt is not on any inhalers and I could not find any evidence of a past referral to pulmonologist for eval nor any pulmonology notes. Consider referral to pulmonology at a future appt. 7. Personal history of tobacco use, presenting hazards to health  Pt was advised that she will be hearing from nurse navigator to schedule the appt for the imaging.     - CT LOW DOSE LUNG CANCER SCREENING; Future     I have discussed the diagnosis with the patient and the intended plan as seen in the above orders. Pt/caretaker has expressed understanding. Questions were answered concerning future plans. I have discussed medication side effects and warnings as indicated with the patient as well. Neo Lantigua, NPDiscussed with the patient the current USPSTF guidelines released March 9, 2021 for screening for lung cancer. For adults aged 48 to [de-identified] years who have a 20 pack-year smoking history and currently smoke or have quit within the past 15 years the grade B recommendation is to:  Screen for lung cancer with low-dose computed tomography (LDCT) every year.   Stop screening once a person has not smoked for 15 years or has a health problem that limits life expectancy or the ability to have lung surgery. The patient has a 75 pack-year history of cigarette smoking and currently is not smoking. Discussed with patient the risks and benefits of screening, including over-diagnosis, false positive rate, and total radiation exposure. The patient currently exhibits no signs or symptoms suggestive of lung cancer. Discussed with patient the importance of compliance with yearly annual lung cancer screenings and willingness to undergo diagnosis and treatment if screening scan is positive. In addition, the patient was counseled regarding the importance of remaining smoke free and/or total smoking cessation.     Also reviewed the following if the patient has Medicare that as of February 10, 2022, Medicare only covers LDCT screening in patients aged 51-72 with at least a 20 pack-year smoking history who currently smoke or have quit in the last 15 years

## 2022-09-13 NOTE — PROGRESS NOTES
1. \"Have you been to the ER, urgent care clinic since your last visit? Hospitalized since your last visit? \" No    2. \"Have you seen or consulted any other health care providers outside of the [address] [place] [address] since your last visit? \" No     3. For patients aged 39-70: Has the patient had a colonoscopy / FIT/ Cologuard? No      If the patient is female:    4. For patients aged 41-77: Has the patient had a mammogram within the past 2 years? Yes - Care Gap present. Most recent result on file      5. For patients aged 21-65: Has the patient had a pap smear? NA - based on age or sex     Chief Complaint   Patient presents with    Physical       Visit Vitals  BP (!) 149/68 (BP 1 Location: Left upper arm, BP Patient Position: Sitting, BP Cuff Size: Adult)   Pulse 81   Temp 97.8 °F (36.6 °C) (Temporal)   Resp 15   Ht 5' 4\" (1.626 m)   Wt 123 lb 6.4 oz (56 kg)   SpO2 98%   BMI 21.18 kg/m²     Pt is here for a physical and bp was elevated both times I checked.

## 2022-09-21 PROBLEM — J43.8 OTHER EMPHYSEMA (HCC): Status: ACTIVE | Noted: 2022-09-21

## 2022-09-22 PROBLEM — G60.9 IDIOPATHIC PERIPHERAL NEUROPATHY: Status: ACTIVE | Noted: 2022-09-22

## 2022-11-01 ENCOUNTER — HOSPITAL ENCOUNTER (OUTPATIENT)
Dept: CT IMAGING | Age: 72
Discharge: HOME | End: 2022-11-01
Attending: NURSE PRACTITIONER
Payer: MEDICARE

## 2022-11-01 VITALS — HEIGHT: 65 IN | WEIGHT: 122 LBS | BODY MASS INDEX: 20.33 KG/M2

## 2022-11-01 DIAGNOSIS — Z87.891 PERSONAL HISTORY OF TOBACCO USE, PRESENTING HAZARDS TO HEALTH: ICD-10-CM

## 2022-11-01 PROCEDURE — 71271 CT THORAX LUNG CANCER SCR C-: CPT

## 2022-11-02 ENCOUNTER — OFFICE VISIT (OUTPATIENT)
Dept: FAMILY MEDICINE CLINIC | Age: 72
End: 2022-11-02
Payer: MEDICARE

## 2022-11-02 ENCOUNTER — TELEPHONE (OUTPATIENT)
Dept: FAMILY MEDICINE CLINIC | Age: 72
End: 2022-11-02

## 2022-11-02 VITALS
TEMPERATURE: 97.3 F | SYSTOLIC BLOOD PRESSURE: 138 MMHG | DIASTOLIC BLOOD PRESSURE: 64 MMHG | RESPIRATION RATE: 18 BRPM | BODY MASS INDEX: 21 KG/M2 | HEART RATE: 75 BPM | WEIGHT: 123 LBS | HEIGHT: 64 IN | OXYGEN SATURATION: 99 %

## 2022-11-02 DIAGNOSIS — J43.8 OTHER EMPHYSEMA (HCC): ICD-10-CM

## 2022-11-02 DIAGNOSIS — Z76.89 ENCOUNTER TO ESTABLISH CARE WITH NEW DOCTOR: ICD-10-CM

## 2022-11-02 DIAGNOSIS — Z12.31 ENCOUNTER FOR SCREENING MAMMOGRAM FOR MALIGNANT NEOPLASM OF BREAST: ICD-10-CM

## 2022-11-02 DIAGNOSIS — I10 PRIMARY HYPERTENSION: Primary | ICD-10-CM

## 2022-11-02 DIAGNOSIS — H40.10X2 OPEN-ANGLE GLAUCOMA, MODERATE STAGE, UNSPECIFIED LATERALITY, UNSPECIFIED OPEN-ANGLE GLAUCOMA TYPE: ICD-10-CM

## 2022-11-02 DIAGNOSIS — Z86.73 HISTORY OF TIA (TRANSIENT ISCHEMIC ATTACK): ICD-10-CM

## 2022-11-02 DIAGNOSIS — L65.9 HAIR LOSS: ICD-10-CM

## 2022-11-02 DIAGNOSIS — E78.2 MIXED HYPERLIPIDEMIA: ICD-10-CM

## 2022-11-02 DIAGNOSIS — Z78.0 ASYMPTOMATIC MENOPAUSAL STATE: ICD-10-CM

## 2022-11-02 DIAGNOSIS — M81.0 OSTEOPOROSIS, UNSPECIFIED OSTEOPOROSIS TYPE, UNSPECIFIED PATHOLOGICAL FRACTURE PRESENCE: ICD-10-CM

## 2022-11-02 PROBLEM — H25.10 NUCLEAR SENILE CATARACT: Status: ACTIVE | Noted: 2021-01-04

## 2022-11-02 PROBLEM — G45.9 TIA (TRANSIENT ISCHEMIC ATTACK): Status: ACTIVE | Noted: 2022-11-02

## 2022-11-02 PROCEDURE — 3075F SYST BP GE 130 - 139MM HG: CPT | Performed by: FAMILY MEDICINE

## 2022-11-02 PROCEDURE — 1123F ACP DISCUSS/DSCN MKR DOCD: CPT | Performed by: FAMILY MEDICINE

## 2022-11-02 PROCEDURE — 99214 OFFICE O/P EST MOD 30 MIN: CPT | Performed by: FAMILY MEDICINE

## 2022-11-02 PROCEDURE — 3078F DIAST BP <80 MM HG: CPT | Performed by: FAMILY MEDICINE

## 2022-11-02 NOTE — PROGRESS NOTES
Subjective  Chief Complaint   Patient presents with    [facility]    Hypertension     HPI:  Scott Mason is a 71 y.o. female with medical problems as listed below who presents to [facility]. Past medical history: HTN, neuropathy, emphysema, osteoporosis, HLD, glaucoma, cataract, TIA  Medications: amlodipine, telmisartan, atorvastatin, latanoprost eye drops, vitamin E, multivitamin, ASA  Allergies: See list in EMR. Specialists: Dr. Emerita Marinelli (Neurology). Dr. Jean Reid (Ophthalmology). Surgical history: None  Family history: Dementia (mom). Heart valve issue (dad). Social history: Former smoker  Colon cancer screening: Last colonoscopy ~7-8 years ago. She says that it was normal and she thinks she was supposed to return in 10 years. Performed at [address] [place] [address] but can't remember who the doctor was. Breast cancer screening: Last mammogram in 11/2021, normal.   Cervical cancer screening: LD CT obtained yesterday, report not yet available. Osteoporosis screening: Last DEXA many years ago. She was on some kind of medication for osteoporosis in the past but again it has been many years. She cannot recall the medication. Lung cancer screening:  Immunizations: COVID x3, due for booster. Had flu shot just 2 weeks ago. HTN: Does occasionally check her BP at home, averaging 130's/80's. She endorses compliance with telmisartan and amlodipine. TIA: Not previously on problem list, but patient has history of TIA. She sees Dr. Emerita Marinelli, Neurology. Has upcoming appointment with her next week. She had CTA H&N in 08/2022 which was limited by motion artifact. Previously carotid dopplers showed bilateral moderate stenosis. Last lipid panel with elevated cholesterol (). Patient endorses compliance with atorvastatin 40mg and ASA. She would like to discuss increasing statin at that appointment. Hair loss: Patient reports hair loss. She is noticing thinning places on her head and a receding hairline.  Uncertain of family history of hair loss. Endorses a well balanced diet. She does admit to increased stress around the time that she started noticing hair loss. Patient Active Problem List   Diagnosis Code    Hypertension I10    Hyperlipidemia E78.5    Osteoporosis M81.0    Other emphysema (HCC) J43.8    Idiopathic peripheral neuropathy G60.9    Open-angle glaucoma, moderate stage H40.10X2    Nuclear senile cataract H25.10    TIA (transient ischemic attack) G45.9     Family History   Problem Relation Age of Onset    Dementia Mother     Heart Disease Father         valve problem     Cancer Brother         Leukemia     Diabetes Brother     Heart Attack Brother         x3    Stroke Brother         x3     Social History     Tobacco Use    Smoking status: Former     Packs/day: 1.50     Years: 50.00     Pack years: 75.00     Types: Cigarettes     Quit date: 2020     Years since quittin.1    Smokeless tobacco: Never   Vaping Use    Vaping Use: Never used   Substance Use Topics    Alcohol use: Yes     Comment: special occassions     Drug use: Never     Current Outpatient Medications on File Prior to Visit   Medication Sig Dispense Refill    telmisartan (MICARDIS) 80 mg tablet Take 1 Tablet by mouth daily. Indications: high blood pressure 90 Tablet 1    amLODIPine (NORVASC) 5 mg tablet Take 1 Tablet by mouth daily. 90 Tablet 1    atorvastatin (LIPITOR) 40 mg tablet Take 1 Tablet by mouth in the morning. 90 Tablet 1    latanoprost (XALATAN) 0.005 % ophthalmic solution Administer 1 Drop to both eyes nightly. vitamin e (E GEMS) 1,000 unit capsule Take 1,000 Units by mouth daily. aspirin (ASPIR-81 PO) Take  by mouth.      multivitamin (ONE A DAY) tablet Take 1 Tab by mouth daily. No current facility-administered medications on file prior to visit. Allergies   Allergen Reactions    Brimonidine Rash    Dorzolamide Rash     Review of Systems   Constitutional:  Negative for chills and fever.    Respiratory:  Negative for cough, shortness of breath and wheezing. Cardiovascular:  Negative for chest pain, palpitations and leg swelling. Gastrointestinal:  Negative for abdominal pain, constipation, diarrhea, nausea and vomiting. Skin:         Hair loss   Neurological:  Negative for weakness and headaches. Psychiatric/Behavioral:  The patient is nervous/anxious. Objective  Visit Vitals  /64 (BP 1 Location: Right upper arm, BP Patient Position: Sitting, BP Cuff Size: Large adult)   Pulse 75   Temp 97.3 °F (36.3 °C) (Temporal)   Resp 18   Ht 5' 4\" (1.626 m)   Wt 123 lb (55.8 kg)   SpO2 99%   BMI 21.11 kg/m²     Physical Exam  Constitutional:       General: She is not in acute distress. Appearance: Normal appearance. HENT:      Head: Normocephalic and atraumatic. Eyes:      Extraocular Movements: Extraocular movements intact. Conjunctiva/sclera: Conjunctivae normal.   Cardiovascular:      Rate and Rhythm: Normal rate and regular rhythm. Heart sounds: Normal heart sounds. Pulmonary:      Effort: Pulmonary effort is normal. No respiratory distress. Breath sounds: Normal breath sounds. Musculoskeletal:         General: No swelling. Normal range of motion. Cervical back: Normal range of motion and neck supple. Skin:     General: Skin is warm and dry. Comments: Receding hairline   Neurological:      General: No focal deficit present. Mental Status: She is alert and oriented to person, place, and time. Motor: No weakness. Gait: Gait normal.   Psychiatric:         Mood and Affect: Mood normal.         Behavior: Behavior normal.         Thought Content: Thought content normal.         Judgment: Judgment normal.        Assessment & Plan    ICD-10-CM ICD-9-CM    1. Primary hypertension  I10 401.9       2. Asymptomatic menopausal state  Z78.0 V49.81 DEXA BONE DENSITY STUDY AXIAL      3.  Encounter for screening mammogram for malignant neoplasm of breast  Z12.31 V76.12 [facility] MAMMO BI SCREENING INCL CAD      4. TIA (transient ischemic attack)  G45.9 435.9       5. Other emphysema ([address]  J43.8 492.8       6. Osteoporosis, unspecified osteoporosis type, unspecified pathological fracture presence  M81.0 733.00       7. Mixed hyperlipidemia  E78.2 272.2       8. Open-angle glaucoma, moderate stage, unspecified laterality, unspecified open-angle glaucoma type  H40.10X2 365.10      365.72       9. Hair loss  L65.9 704.00       10. Encounter to establish care with new doctor  Z76.89 V65.8         Diagnoses and all orders for this visit:    1. Primary hypertension  Well controlled. Continue telmisartan 80mg daily, amlodipine 5mg daily. Low sodium diet and regular aerobic exercise. 2. Asymptomatic menopausal state  -     DEXA BONE DENSITY STUDY AXIAL; Future  Uncertain history of osteoporosis. No DEXA in EMR available for review. Possibly treated with alendronate in the past.   3. Encounter for screening mammogram for malignant neoplasm of breast  -     [facility] MAMMO BI SCREENING INCL CAD; Future  4. TIA (transient ischemic attack)  Hospitalized x2 for this problem. Has neurologist appointment next week. Recommended patient increase atorvastatin to 80mg but she would like to speak to neurologist prior to this. 5. Other emphysema ([address]  Asymptomatic, incidental finding on imaging. Extensive former tobacco history. 6. Osteoporosis, unspecified osteoporosis type, unspecified pathological fracture presence  See problem #2.   7. Mixed hyperlipidemia  See problem #4. Continue atorvastatin 40mg daily. 8. Open-angle glaucoma, moderate stage, unspecified laterality, unspecified open-angle glaucoma type  Managed by Dr. Zuri Blank. Continue latanoprost eye drops. 9. Hair loss  Did not have sufficient time to fully address. Suspect increased stress is playing a role. Might also be a genetic component; receding hairline evident on exam. patient is uncertain of maternal medical history.  Consider Dermatology referral. 10. Encounter to establish care with new doctor  Chart reviewed, history collected from patient. Care gaps discussed. Aspects of this note have been generated using voice recognition software. Despite editing, there may be some syntax errors. Follow-up and Dispositions    Return in about 9 months (around 7/20/2023) for Chronic problems.        Lalo Ibarra, DO

## 2022-11-04 ENCOUNTER — OFFICE VISIT (OUTPATIENT)
Dept: ENT CLINIC | Age: 72
End: 2022-11-04
Payer: MEDICARE

## 2022-11-04 VITALS
BODY MASS INDEX: 21 KG/M2 | DIASTOLIC BLOOD PRESSURE: 78 MMHG | WEIGHT: 123 LBS | HEART RATE: 77 BPM | OXYGEN SATURATION: 98 % | SYSTOLIC BLOOD PRESSURE: 120 MMHG | RESPIRATION RATE: 17 BRPM | HEIGHT: 64 IN

## 2022-11-04 DIAGNOSIS — H91.93 DECREASED HEARING, BILATERAL: Primary | ICD-10-CM

## 2022-11-04 DIAGNOSIS — H61.23 BILATERAL IMPACTED CERUMEN: ICD-10-CM

## 2022-11-04 PROCEDURE — 1090F PRES/ABSN URINE INCON ASSESS: CPT | Performed by: OTOLARYNGOLOGY

## 2022-11-04 PROCEDURE — 69210 REMOVE IMPACTED EAR WAX UNI: CPT | Performed by: OTOLARYNGOLOGY

## 2022-11-04 PROCEDURE — G8432 DEP SCR NOT DOC, RNG: HCPCS | Performed by: OTOLARYNGOLOGY

## 2022-11-04 PROCEDURE — G8754 DIAS BP LESS 90: HCPCS | Performed by: OTOLARYNGOLOGY

## 2022-11-04 PROCEDURE — G8420 CALC BMI NORM PARAMETERS: HCPCS | Performed by: OTOLARYNGOLOGY

## 2022-11-04 PROCEDURE — 3017F COLORECTAL CA SCREEN DOC REV: CPT | Performed by: OTOLARYNGOLOGY

## 2022-11-04 PROCEDURE — G9899 SCRN MAM PERF RSLTS DOC: HCPCS | Performed by: OTOLARYNGOLOGY

## 2022-11-04 PROCEDURE — G8536 NO DOC ELDER MAL SCRN: HCPCS | Performed by: OTOLARYNGOLOGY

## 2022-11-04 PROCEDURE — G8752 SYS BP LESS 140: HCPCS | Performed by: OTOLARYNGOLOGY

## 2022-11-04 PROCEDURE — 3078F DIAST BP <80 MM HG: CPT | Performed by: OTOLARYNGOLOGY

## 2022-11-04 PROCEDURE — 3074F SYST BP LT 130 MM HG: CPT | Performed by: OTOLARYNGOLOGY

## 2022-11-04 PROCEDURE — 99203 OFFICE O/P NEW LOW 30 MIN: CPT | Performed by: OTOLARYNGOLOGY

## 2022-11-04 PROCEDURE — 1123F ACP DISCUSS/DSCN MKR DOCD: CPT | Performed by: OTOLARYNGOLOGY

## 2022-11-04 PROCEDURE — 1101F PT FALLS ASSESS-DOCD LE1/YR: CPT | Performed by: OTOLARYNGOLOGY

## 2022-11-04 PROCEDURE — G8427 DOCREV CUR MEDS BY ELIG CLIN: HCPCS | Performed by: OTOLARYNGOLOGY

## 2022-11-04 NOTE — PROGRESS NOTES
Otolaryngology-Head and Neck Surgery  New Patient Visit     Patient: Petar Maravilla  YOB: 1950  MRN: [id number]  Date of Service: 2022    Chief Complaint:   Chief Complaint   Patient presents with    New Patient    Wax in Ear         History of Present Illness: Petar Maravilla is a 71y.o. year old female who presents today for discussion of her ears    Notes recently has had bilateral ear plugging and decreased hearing  Seen by PCP and noted to have cerumen impactions    Has had issues with cerumen in the past, with prior ear cleaning some years ago    No prior ear surgeries     Past Medical History:  Past Medical History:   Diagnosis Date    Hyperlipidemia     Hypertension     Neuropathy     bilateral feet     Osteoporosis        Past Surgical History:   Past Surgical History:   Procedure Laterality Date    HX COLONOSCOPY      due  but due to COVID-19 will wait    HX HEENT      open up duct in eye       Medications:   Current Outpatient Medications   Medication Instructions    amLODIPine (NORVASC) 5 mg, Oral, DAILY    aspirin (ASPIR-81 PO) Oral    atorvastatin (LIPITOR) 40 mg, Oral, DAILY    latanoprost (XALATAN) 0.005 % ophthalmic solution 1 Drop, Both Eyes, EVERY BEDTIME    multivitamin (ONE A DAY) tablet 1 Tablet, Oral, DAILY    telmisartan (MICARDIS) 80 mg, Oral, DAILY    vitamin e (E GEMS) 1,000 Units, Oral, DAILY       Allergies:    Allergies   Allergen Reactions    Brimonidine Rash    Dorzolamide Rash       Social History:   Social History     Tobacco Use    Smoking status: Former     Packs/day: 1.50     Years: 50.00     Pack years: 75.00     Types: Cigarettes     Quit date: 2020     Years since quittin.1    Smokeless tobacco: Never   Vaping Use    Vaping Use: Never used   Substance Use Topics    Alcohol use: Yes     Comment: special occassions     Drug use: Never        Family History:  Family History   Problem Relation Age of Onset    Dementia Mother     Heart Disease Father         valve problem     Cancer Brother         Leukemia     Diabetes Brother     Heart Attack Brother         x3    Stroke Brother         x3       Review of Systems:    Consitutional: denies fever, excessive weight gain or loss. Eyes: denies diplopia, eye pain. Integumentary: denies new concerning skin lesions. Ears, Nose, Mouth, Throat: denies except as per HPI. Endocrine: denies hot or cold intolerance, increased thirst.  Respiratory: denies cough, hemoptysis, wheezing  Gastrointestinal: denies trouble swallowing, nausea, emesis, regurgitation  Musculoskeletal: denies muscle weakness or wasting  Cardiovascular: denies chest pain, shortness of breath  Neurologic: denies seizures, numbness or tingling, syncope  Hematologic: denies easy bleeding or bruising    Physical Examination:   Vitals:    11/04/22 1111   BP: 120/78   BP 1 Location: Left upper arm   BP Patient Position: Sitting   BP Cuff Size: Adult   Pulse: 77   Resp: 17   Height: 5' 4\" (1.626 m)   Weight: 123 lb (55.8 kg)   SpO2: 98%        General: Comfortable, pleasant, appears stated age  Voice: Strong, speaking in full sentences, no stridor    Face: No masses or lesions, facial strength symmetric   Ears: External ears unremarkable. Bilateral cerumen impactions, L ear complete, R ear partial. Following removal, TM clear and intact   Nose: External nose unremarkable. Dorsum midline. Anterior rhinoscopy demonstrates no lesions. Septum midline. Turbinates without hypertrophy. Oral Cavity / Oropharynx: No trismus. Mucosa pink and moist. No lesions. Tongue is midline and mobile. Palate elevates symmetrically. Uvula midline. Tonsils unremarkable. Base of tongue soft. Floor of mouth soft. Neck: Supple. No adenopathy. Thyroid unremarkable. Palpable laryngeal landmarks. Full neck range of motion   Neurologic: CN II - XI intact. Normal gait    PROCEDURE: BILATERAL MICROSCOPY WITH CERUMEN DEBRIDEMENT    Using the microscope, both ears were examined.  A 5 Fr suction and alligator were used to debride the ears of cerumen revealing a clear and intact TM bilaterally. Patient tolerated the procedure well     Assessment and Plan:   Bilateral ear plugging  Bilateral cerumen impactions  - Cerumen debrided as above with improvement in symptoms  - Can let us know if any residual hearing concerns  - Otherwise would follow up PRN       The patient was instructed to return to clinic if no improvement or progression of symptoms. Signs to watch out for reviewed.       MD Lakhwinder [place] [id number] [facility]  [address] [place] [address]  Office Phone: [phone]

## 2022-11-09 ENCOUNTER — TRANSCRIBE ORDER (OUTPATIENT)
Dept: SCHEDULING | Age: 72
End: 2022-11-09

## 2022-11-09 DIAGNOSIS — Z12.31 ENCOUNTER FOR SCREENING MAMMOGRAM FOR MALIGNANT NEOPLASM OF BREAST: Primary | ICD-10-CM

## 2022-12-09 ENCOUNTER — HOSPITAL ENCOUNTER (OUTPATIENT)
Dept: MAMMOGRAPHY | Age: 72
End: 2022-12-09
Attending: FAMILY MEDICINE
Payer: MEDICARE

## 2022-12-09 DIAGNOSIS — Z78.0 ASYMPTOMATIC MENOPAUSAL STATE: ICD-10-CM

## 2022-12-09 DIAGNOSIS — Z12.31 ENCOUNTER FOR SCREENING MAMMOGRAM FOR MALIGNANT NEOPLASM OF BREAST: ICD-10-CM

## 2022-12-09 PROCEDURE — 77063 BREAST TOMOSYNTHESIS BI: CPT

## 2022-12-09 PROCEDURE — 77080 DXA BONE DENSITY AXIAL: CPT

## 2022-12-14 ENCOUNTER — TELEPHONE (OUTPATIENT)
Dept: FAMILY MEDICINE CLINIC | Age: 72
End: 2022-12-14

## 2022-12-14 NOTE — TELEPHONE ENCOUNTER
Informed patient of results and physicians recommendations   Patient voiced understanding and requesting a call 12/15 for an appt           ----- Message from Donnie Bullard DO sent at 12/14/2022  2:58 PM EST -----  Your bone density scan did show that you have osteoporosis. This places you at risk for a fracture. If you are interested in a medication to try to prevent further bone loss, please call the clinic to schedule an appointment so that we can discuss medication options. This can be a virtual visit if that is easier for you.

## 2022-12-14 NOTE — PROGRESS NOTES
Your bone density scan did show that you have osteoporosis. This places you at risk for a fracture. If you are interested in a medication to try to prevent further bone loss, please call the clinic to schedule an appointment so that we can discuss medication options. This can be a virtual visit if that is easier for you.

## 2022-12-27 RX ORDER — ALBUTEROL SULFATE 90 UG/1
AEROSOL, METERED RESPIRATORY (INHALATION)
COMMUNITY
End: 2023-01-25

## 2022-12-27 RX ORDER — TRAZODONE HYDROCHLORIDE 50 MG/1
TABLET ORAL
COMMUNITY
End: 2023-01-25

## 2023-01-06 ENCOUNTER — OFFICE VISIT (OUTPATIENT)
Dept: FAMILY MEDICINE CLINIC | Age: 73
End: 2023-01-06
Payer: MEDICARE

## 2023-01-06 VITALS
RESPIRATION RATE: 18 BRPM | HEIGHT: 64 IN | DIASTOLIC BLOOD PRESSURE: 75 MMHG | WEIGHT: 125 LBS | OXYGEN SATURATION: 100 % | TEMPERATURE: 97.1 F | SYSTOLIC BLOOD PRESSURE: 159 MMHG | HEART RATE: 72 BPM | BODY MASS INDEX: 21.34 KG/M2

## 2023-01-06 DIAGNOSIS — I10 PRIMARY HYPERTENSION: ICD-10-CM

## 2023-01-06 DIAGNOSIS — M81.0 OSTEOPOROSIS, UNSPECIFIED OSTEOPOROSIS TYPE, UNSPECIFIED PATHOLOGICAL FRACTURE PRESENCE: Primary | ICD-10-CM

## 2023-01-06 PROCEDURE — 1123F ACP DISCUSS/DSCN MKR DOCD: CPT | Performed by: FAMILY MEDICINE

## 2023-01-06 PROCEDURE — 1090F PRES/ABSN URINE INCON ASSESS: CPT | Performed by: FAMILY MEDICINE

## 2023-01-06 PROCEDURE — G8427 DOCREV CUR MEDS BY ELIG CLIN: HCPCS | Performed by: FAMILY MEDICINE

## 2023-01-06 PROCEDURE — G8510 SCR DEP NEG, NO PLAN REQD: HCPCS | Performed by: FAMILY MEDICINE

## 2023-01-06 PROCEDURE — G8536 NO DOC ELDER MAL SCRN: HCPCS | Performed by: FAMILY MEDICINE

## 2023-01-06 PROCEDURE — 99214 OFFICE O/P EST MOD 30 MIN: CPT | Performed by: FAMILY MEDICINE

## 2023-01-06 PROCEDURE — 1101F PT FALLS ASSESS-DOCD LE1/YR: CPT | Performed by: FAMILY MEDICINE

## 2023-01-06 PROCEDURE — G8420 CALC BMI NORM PARAMETERS: HCPCS | Performed by: FAMILY MEDICINE

## 2023-01-06 PROCEDURE — 3017F COLORECTAL CA SCREEN DOC REV: CPT | Performed by: FAMILY MEDICINE

## 2023-01-06 PROCEDURE — 3074F SYST BP LT 130 MM HG: CPT | Performed by: FAMILY MEDICINE

## 2023-01-06 PROCEDURE — G9899 SCRN MAM PERF RSLTS DOC: HCPCS | Performed by: FAMILY MEDICINE

## 2023-01-06 PROCEDURE — 3078F DIAST BP <80 MM HG: CPT | Performed by: FAMILY MEDICINE

## 2023-01-06 RX ORDER — ALENDRONATE SODIUM 70 MG/1
70 TABLET ORAL
Qty: 12 TABLET | Refills: 3 | Status: SHIPPED | OUTPATIENT
Start: 2023-01-06

## 2023-01-06 NOTE — PROGRESS NOTES
1. Have you been to the ER, urgent care clinic since your last visit? Hospitalized since your last visit? No    2. Have you seen or consulted any other health care providers outside of the [address] [place] [address] since your last visit? Include any pap smears or colon screening. Yes, Dr. Polly Robbins , [facility]     Chief Complaint   Patient presents with    Follow-up    Osteoporosis     Visit Vitals  BP (!) 159/75 (BP 1 Location: Right upper arm, BP Patient Position: Sitting, BP Cuff Size: Adult)   Pulse 72   Temp 97.1 °F (36.2 °C) (Temporal)   Resp 18   Ht 5' 4\" (1.626 m)   Wt 125 lb (56.7 kg)   SpO2 100%   BMI 21.46 kg/m²

## 2023-01-06 NOTE — PROGRESS NOTES
Subjective  Chief Complaint   Patient presents with    Follow-up    Osteoporosis     HPI:  Yoanna Melgar is a 71 y.o. female with medical problems as listed below who presents for osteoporosis follow up. Osteoporosis: DEXA showing osteoporosis of right femoral neck and lumbar spine. Patient could not recall when last DEXA was completed but states it was many years ago. As previously stated, patient has taken a medication in the past to treat osteoporosis, but it has been years ago. She is currently taking vitamin D 1000 units daily, calcium 1000mg daily. She is also doing weight-bearing exercise regularly. She is interested in pharmacotherapy at this time. HTN: Typically takes medication at night but missed dose last night. She is prescribed amlodipine 5mg and telmisartan 80mg daily. Patient Active Problem List   Diagnosis Code    Hypertension I10    Hyperlipidemia E78.5    Osteoporosis M81.0    Other emphysema (HCC) J43.8    Idiopathic peripheral neuropathy G60.9    Open-angle glaucoma, moderate stage H40.10X2    Nuclear senile cataract H25.10    History of TIA (transient ischemic attack) Z86.73     Family History   Problem Relation Age of Onset    Dementia Mother     Heart Disease Father         valve problem     Cancer Brother         Leukemia     Diabetes Brother     Heart Attack Brother         x3    Stroke Brother         x3     Social History     Tobacco Use    Smoking status: Former     Packs/day: 1.50     Years: 50.00     Pack years: 75.00     Types: Cigarettes     Quit date: 2020     Years since quittin.4    Smokeless tobacco: Never   Vaping Use    Vaping Use: Never used   Substance Use Topics    Alcohol use: Not Currently     Comment: special occassions     Drug use: Never     Current Outpatient Medications on File Prior to Visit   Medication Sig Dispense Refill    telmisartan (MICARDIS) 80 mg tablet Take 1 Tablet by mouth daily.  Indications: high blood pressure 90 Tablet 1    amLODIPine (NORVASC) 5 mg tablet Take 1 Tablet by mouth daily. 90 Tablet 1    latanoprost (XALATAN) 0.005 % ophthalmic solution Administer 1 Drop to both eyes nightly. vitamin e (E GEMS) 1,000 unit capsule Take 1,000 Units by mouth daily. aspirin (ASPIR-81 PO) Take  by mouth.      multivitamin (ONE A DAY) tablet Take 1 Tab by mouth daily. albuterol (PROVENTIL HFA, VENTOLIN HFA, PROAIR HFA) 90 mcg/actuation inhaler ProAir HFA 90 mcg/actuation aerosol inhaler   2 ppuff every 4 to 6 hr as needed (Patient not taking: Reported on 1/6/2023)      traZODone (DESYREL) 50 mg tablet trazodone 50 mg tablet (Patient not taking: Reported on 1/6/2023)       No current facility-administered medications on file prior to visit. Allergies   Allergen Reactions    Brimonidine Rash    Dorzolamide Rash     Review of Systems   Constitutional:  Negative for chills and fever. Respiratory:  Negative for cough, shortness of breath and wheezing. Cardiovascular:  Negative for chest pain, palpitations and leg swelling. Gastrointestinal:  Negative for abdominal pain, constipation, diarrhea, nausea and vomiting. Neurological:  Negative for weakness and headaches. Psychiatric/Behavioral:  Negative for depression. The patient is not nervous/anxious. Objective  Visit Vitals  BP (!) 159/75 (BP 1 Location: Right upper arm, BP Patient Position: Sitting, BP Cuff Size: Adult)   Pulse 72   Temp 97.1 °F (36.2 °C) (Temporal)   Resp 18   Ht 5' 4\" (1.626 m)   Wt 125 lb (56.7 kg)   SpO2 100%   BMI 21.46 kg/m²     Physical Exam  Constitutional:       General: She is not in acute distress. Appearance: Normal appearance. HENT:      Head: Normocephalic and atraumatic. Eyes:      Extraocular Movements: Extraocular movements intact. Conjunctiva/sclera: Conjunctivae normal.   Cardiovascular:      Rate and Rhythm: Normal rate and regular rhythm. Heart sounds: Normal heart sounds.    Pulmonary:      Effort: Pulmonary effort is normal. No respiratory distress. Breath sounds: Normal breath sounds. Musculoskeletal:         General: No swelling. Normal range of motion. Cervical back: Normal range of motion. Skin:     General: Skin is warm and dry. Neurological:      General: No focal deficit present. Mental Status: She is alert and oriented to person, place, and time. Motor: No weakness. Gait: Gait normal.   Psychiatric:         Mood and Affect: Mood normal.         Behavior: Behavior normal.         Thought Content: Thought content normal.         Judgment: Judgment normal.        Assessment & Plan    ICD-10-CM ICD-9-CM    1. Osteoporosis, unspecified osteoporosis type, unspecified pathological fracture presence  M81.0 733.00 VITAMIN D, 25 HYDROXY      METABOLIC PANEL, COMPREHENSIVE      2. Primary hypertension  I10 401.9         Diagnoses and all orders for this visit:    1. Osteoporosis, unspecified osteoporosis type, unspecified pathological fracture presence  -     VITAMIN D, 25 HYDROXY  -     METABOLIC PANEL, COMPREHENSIVE  Will check calcium and vitamin D levels, results pending. DEXA completed on 12/9/22 showing osteoporosis, no previous exam to compare. Discussed pharmacotherapy options. Patient is interested in starting Fosamax. Discussed risks versus benefits, patient voices understanding. 2. Primary hypertension  Uncontrolled secondary to missing dose of medication. Continue amlodipine 5mg daily, telmisartan 80mg daily. Other orders  -     alendronate (FOSAMAX) 70 mg tablet; Take 1 Tablet by mouth every seven (7) days. Aspects of this note have been generated using voice recognition software. Despite editing, there may be some syntax errors. Follow-up and Dispositions    Return in about 7 months (around 8/6/2023) for Medicare wellness exam, chronic problems.        Nicho Houser DO

## 2023-01-10 LAB
25(OH)D3+25(OH)D2 SERPL-MCNC: 51.7 NG/ML (ref 30–100)
ALBUMIN SERPL-MCNC: 4.6 G/DL (ref 3.7–4.7)
ALBUMIN/GLOB SERPL: 2.6 {RATIO} (ref 1.2–2.2)
ALP SERPL-CCNC: 67 IU/L (ref 44–121)
ALT SERPL-CCNC: 16 IU/L (ref 0–32)
AST SERPL-CCNC: 16 IU/L (ref 0–40)
BILIRUB SERPL-MCNC: 0.2 MG/DL (ref 0–1.2)
BUN SERPL-MCNC: 12 MG/DL (ref 8–27)
BUN/CREAT SERPL: 21 (ref 12–28)
CALCIUM SERPL-MCNC: 9.2 MG/DL (ref 8.7–10.3)
CHLORIDE SERPL-SCNC: 96 MMOL/L (ref 96–106)
CO2 SERPL-SCNC: 25 MMOL/L (ref 20–29)
CREAT SERPL-MCNC: 0.58 MG/DL (ref 0.57–1)
EGFR: 97 ML/MIN/1.73
GLOBULIN SER CALC-MCNC: 1.8 G/DL (ref 1.5–4.5)
GLUCOSE SERPL-MCNC: 103 MG/DL (ref 70–99)
POTASSIUM SERPL-SCNC: 4.6 MMOL/L (ref 3.5–5.2)
PROT SERPL-MCNC: 6.4 G/DL (ref 6–8.5)
SODIUM SERPL-SCNC: 134 MMOL/L (ref 134–144)

## 2023-01-18 ENCOUNTER — TELEPHONE (OUTPATIENT)
Dept: FAMILY MEDICINE CLINIC | Age: 73
End: 2023-01-18

## 2023-01-18 NOTE — TELEPHONE ENCOUNTER
Phoned patient, informed of lab results and providers recommendations. Patient voiced understanding .

## 2023-01-18 NOTE — PROGRESS NOTES
Vitamin D level is within normal limits. Please continue your vitamin D supplement.    Electrolytes, kidney function, and liver function are normal.

## 2023-01-18 NOTE — TELEPHONE ENCOUNTER
----- Message from Sue Stratton DO sent at 1/18/2023  7:46 AM EST -----  Vitamin D level is within normal limits. Please continue your vitamin D supplement.    Electrolytes, kidney function, and liver function are normal.

## 2023-01-23 ENCOUNTER — TELEPHONE (OUTPATIENT)
Dept: FAMILY MEDICINE CLINIC | Age: 73
End: 2023-01-23

## 2023-01-23 DIAGNOSIS — E78.2 MIXED HYPERLIPIDEMIA: ICD-10-CM

## 2023-01-23 NOTE — TELEPHONE ENCOUNTER
Left message for patient informing lab results and providers recommendations are available on My Chart    If patient has any concerns or questions please call

## 2023-01-23 NOTE — TELEPHONE ENCOUNTER
Last OV:01/06/2023  Next OV:08/11/2023  Last Refill:08/02/2022  Last (labs):01/06/2023    Requested Prescriptions     Pending Prescriptions Disp Refills    atorvastatin (LIPITOR) 40 mg tablet 90 Tablet 1     Sig: Take 1 Tablet by mouth daily.

## 2023-01-25 RX ORDER — ATORVASTATIN CALCIUM 40 MG/1
40 TABLET, FILM COATED ORAL DAILY
Qty: 90 TABLET | Refills: 1 | Status: SHIPPED | OUTPATIENT
Start: 2023-01-25

## 2023-03-07 DIAGNOSIS — I10 PRIMARY HYPERTENSION: ICD-10-CM

## 2023-03-07 RX ORDER — AMLODIPINE BESYLATE 5 MG/1
5 TABLET ORAL DAILY
Qty: 90 TABLET | Refills: 3 | Status: SHIPPED | OUTPATIENT
Start: 2023-03-07

## 2023-03-07 RX ORDER — TELMISARTAN 80 MG/1
80 TABLET ORAL DAILY
Qty: 90 TABLET | Refills: 3 | Status: SHIPPED | OUTPATIENT
Start: 2023-03-07

## 2023-03-07 NOTE — TELEPHONE ENCOUNTER
Last OV:01/06/2023  Next OV:08/11/2023  Last Refill:  Telmisartan:09/13/2022  Amlodipine:09/13/2022    Last (labs):01/09/2023    -*Insert any notes here*  Requested Prescriptions     Pending Prescriptions Disp Refills    telmisartan (MICARDIS) 80 mg tablet 90 Tablet 1     Sig: Take 1 Tablet by mouth daily. Indications: high blood pressure    amLODIPine (NORVASC) 5 mg tablet 90 Tablet 1     Sig: Take 1 Tablet by mouth daily.

## 2023-04-04 ENCOUNTER — OFFICE VISIT (OUTPATIENT)
Dept: FAMILY MEDICINE CLINIC | Age: 73
End: 2023-04-04
Payer: MEDICARE

## 2023-04-04 PROCEDURE — 1123F ACP DISCUSS/DSCN MKR DOCD: CPT | Performed by: FAMILY MEDICINE

## 2023-04-04 PROCEDURE — 3074F SYST BP LT 130 MM HG: CPT | Performed by: FAMILY MEDICINE

## 2023-04-04 PROCEDURE — 3078F DIAST BP <80 MM HG: CPT | Performed by: FAMILY MEDICINE

## 2023-04-04 PROCEDURE — 99213 OFFICE O/P EST LOW 20 MIN: CPT | Performed by: FAMILY MEDICINE

## 2023-04-04 RX ORDER — TRIAMCINOLONE ACETONIDE 1 MG/G
OINTMENT TOPICAL
Qty: 30 G | Refills: 1 | Status: SHIPPED
Start: 2023-04-04 | End: 2023-04-05 | Stop reason: SDUPTHER

## 2023-04-04 NOTE — PROGRESS NOTES
1. Have you been to the ER, urgent care clinic since your last visit? Hospitalized since your last visit? No    2. Have you seen or consulted any other health care providers outside of the [address] [place] [address] since your last visit? Include any pap smears or colon screening.  No      Chief Complaint   Patient presents with    Eye Drainage    Eye Pain    Eye Swelling     Visit Vitals  /62 (BP 1 Location: Right upper arm, BP Patient Position: Sitting, BP Cuff Size: Adult)   Pulse 88   Temp 98.7 °F (37.1 °C) (Temporal)   Resp 18   Ht 5' 4\" (1.626 m)   Wt 127 lb (57.6 kg)   SpO2 97%   BMI 21.80 kg/m²

## 2023-04-04 NOTE — PROGRESS NOTES
Ricardo Burdick (: 1950) is a 71 y.o. female, established patient, here for evaluation of the following chief complaint(s): Eye Drainage, Eye Pain, and Eye Swelling       ASSESSMENT/PLAN:  Below is the assessment and plan developed based on review of pertinent history, physical exam, labs, studies, and medications. 1. Irritant contact dermatitis due to other agents    Return in 4 months (on 2023). SUBJECTIVE/OBJECTIVE:  Eye Drainage   The history is provided by the Patient. This is a new problem. Episode onset: 5 days. The problem has been gradually worsening. Both eyes are affected. The injury mechanism was none (started nasal CPAP mask 3 weeks ago). There is No history of trauma to the eye. There is No known exposure to pink eye. She Does not wear contacts. Associated symptoms comments: Periorital skin pain worse in am with rash. Treatments tried: OTC Cortaid tried without efficacy. Review of Systems   Constitutional:  Negative for chills. HENT:  Negative for nosebleeds. Physical Exam  Constitutional:       Appearance: Normal appearance. She is normal weight. HENT:      Head: Normocephalic. Nose: Nose normal. No congestion or rhinorrhea. Mouth/Throat:      Mouth: Mucous membranes are moist.      Pharynx: Oropharynx is clear. No oropharyngeal exudate or posterior oropharyngeal erythema. Eyes:      General: No scleral icterus. Right eye: No discharge. Left eye: No discharge. Conjunctiva/sclera: Conjunctivae normal.   Cardiovascular:      Rate and Rhythm: Normal rate and regular rhythm. Heart sounds: Normal heart sounds. Pulmonary:      Effort: Pulmonary effort is normal.      Breath sounds: Normal breath sounds. Musculoskeletal:      Cervical back: No tenderness. Lymphadenopathy:      Cervical: No cervical adenopathy. Skin:     Findings: No rash (periorbital and no swelling, induration).    Neurological:      Mental Status: She is alert and oriented to person, place, and time. Gait: Gait normal.   Psychiatric:         Mood and Affect: Mood normal.         Behavior: Behavior normal.         Thought Content: Thought content normal.         Judgment: Judgment normal.         An electronic signature was used to authenticate this note.   -- Christelle Narayanan MD

## 2023-04-05 RX ORDER — TRIAMCINOLONE ACETONIDE 1 MG/G
OINTMENT TOPICAL
Qty: 30 G | Refills: 1 | Status: SHIPPED
Start: 2023-04-05

## 2023-04-05 NOTE — TELEPHONE ENCOUNTER
Requested Prescriptions     Pending Prescriptions Disp Refills    triamcinolone acetonide (KENALOG) 0.1 % ointment 30 g 1     Sig: Apply  to affected area nightly as needed for Skin Irritation. use thin layer sparingly to face where CPAP mask touches face 30 minutes before bedtime              Patient called stating that the pharmacy did not receive the medication. I call the pharmacy and confirmed that they do not have it and that it needs to be resent.

## 2023-04-05 NOTE — TELEPHONE ENCOUNTER
Pt called stating that [facility] pharmacy did not receive Rx for Kenalog that was sent yesterday. Please advise. Would like a CB to mobile number on file.

## 2023-07-24 DIAGNOSIS — E78.5 HYPERLIPIDEMIA, UNSPECIFIED HYPERLIPIDEMIA TYPE: Primary | ICD-10-CM

## 2023-07-24 NOTE — TELEPHONE ENCOUNTER
Patient called and stated she needs a refill on the below medication and send to [facility].     Atorvastatin 40 mg tablet

## 2023-07-24 NOTE — TELEPHONE ENCOUNTER
Requested Prescriptions     Pending Prescriptions Disp Refills    atorvastatin (LIPITOR) 40 MG tablet 90 tablet 3     Sig: Take 1 tablet by mouth daily       Last OV:04/04/2023  Next OV:08/11/2023  Last Refill:01/25/2023  Last (labs):01/09/2023    -*Insert any notes here*

## 2023-07-26 RX ORDER — ATORVASTATIN CALCIUM 40 MG/1
40 TABLET, FILM COATED ORAL DAILY
Qty: 90 TABLET | Refills: 3 | Status: SHIPPED | OUTPATIENT
Start: 2023-07-26

## 2023-08-11 ENCOUNTER — OFFICE VISIT (OUTPATIENT)
Facility: CLINIC | Age: 73
End: 2023-08-11

## 2023-08-11 VITALS
BODY MASS INDEX: 21.68 KG/M2 | SYSTOLIC BLOOD PRESSURE: 141 MMHG | HEIGHT: 64 IN | HEART RATE: 73 BPM | RESPIRATION RATE: 18 BRPM | DIASTOLIC BLOOD PRESSURE: 68 MMHG | TEMPERATURE: 97.7 F | WEIGHT: 127 LBS | OXYGEN SATURATION: 99 %

## 2023-08-11 DIAGNOSIS — I10 PRIMARY HYPERTENSION: ICD-10-CM

## 2023-08-11 DIAGNOSIS — Z12.31 ENCOUNTER FOR SCREENING MAMMOGRAM FOR MALIGNANT NEOPLASM OF BREAST: ICD-10-CM

## 2023-08-11 DIAGNOSIS — Z00.00 MEDICARE ANNUAL WELLNESS VISIT, SUBSEQUENT: ICD-10-CM

## 2023-08-11 DIAGNOSIS — M81.0 AGE-RELATED OSTEOPOROSIS WITHOUT CURRENT PATHOLOGICAL FRACTURE: ICD-10-CM

## 2023-08-11 DIAGNOSIS — Z00.00 MEDICARE ANNUAL WELLNESS VISIT, SUBSEQUENT: Primary | ICD-10-CM

## 2023-08-11 DIAGNOSIS — E78.5 HYPERLIPIDEMIA, UNSPECIFIED HYPERLIPIDEMIA TYPE: ICD-10-CM

## 2023-08-11 PROBLEM — R25.1 TREMOR: Status: ACTIVE | Noted: 2023-08-11

## 2023-08-11 PROBLEM — H52.229 REGULAR ASTIGMATISM: Status: ACTIVE | Noted: 2023-08-11

## 2023-08-11 PROBLEM — I65.29 CAROTID ATHEROSCLEROSIS: Status: ACTIVE | Noted: 2023-08-11

## 2023-08-11 PROBLEM — H52.4 PRESBYOPIA: Status: ACTIVE | Noted: 2023-08-11

## 2023-08-11 PROBLEM — L70.9 ACNE: Status: ACTIVE | Noted: 2023-08-11

## 2023-08-11 PROBLEM — E78.01 FAMILIAL HYPERCHOLESTEROLEMIA: Status: ACTIVE | Noted: 2023-08-11

## 2023-08-11 PROBLEM — D49.89: Status: ACTIVE | Noted: 2023-08-11

## 2023-08-11 PROBLEM — R92.8 ABNORMAL FINDINGS ON DIAGNOSTIC IMAGING OF BREAST: Status: ACTIVE | Noted: 2023-08-11

## 2023-08-11 PROBLEM — H40.059 OCULAR HYPERTENSION: Status: ACTIVE | Noted: 2023-08-11

## 2023-08-11 PROBLEM — R73.01 IMPAIRED FASTING GLUCOSE: Status: ACTIVE | Noted: 2023-08-11

## 2023-08-11 PROBLEM — J40 BRONCHITIS, NOT SPECIFIED AS ACUTE OR CHRONIC: Status: ACTIVE | Noted: 2023-08-11

## 2023-08-11 PROBLEM — M85.80 OSTEOPENIA: Status: ACTIVE | Noted: 2023-08-11

## 2023-08-11 PROBLEM — F41.9 ANXIETY: Status: ACTIVE | Noted: 2023-08-11

## 2023-08-11 PROBLEM — J30.1 ALLERGIC RHINITIS DUE TO POLLEN: Status: ACTIVE | Noted: 2023-08-11

## 2023-08-11 PROBLEM — M85.80 OSTEOPENIA: Status: RESOLVED | Noted: 2023-08-11 | Resolved: 2023-08-11

## 2023-08-11 PROBLEM — M84.376A METATARSAL STRESS FRACTURE: Status: ACTIVE | Noted: 2023-08-11

## 2023-08-11 PROBLEM — R00.2 PALPITATIONS: Status: ACTIVE | Noted: 2023-08-11

## 2023-08-11 PROBLEM — L71.9 ROSACEA, UNSPECIFIED: Status: ACTIVE | Noted: 2023-08-11

## 2023-08-11 PROBLEM — R20.1 HYPESTHESIA: Status: ACTIVE | Noted: 2023-08-11

## 2023-08-11 PROBLEM — H40.019 OPEN ANGLE WITH BORDERLINE FINDINGS, LOW RISK, UNSPECIFIED EYE: Status: ACTIVE | Noted: 2023-08-11

## 2023-08-11 PROBLEM — H61.23 IMPACTED CERUMEN, BILATERAL: Status: ACTIVE | Noted: 2023-08-11

## 2023-08-11 SDOH — ECONOMIC STABILITY: TRANSPORTATION INSECURITY
IN THE PAST 12 MONTHS, HAS LACK OF TRANSPORTATION KEPT YOU FROM MEETINGS, WORK, OR FROM GETTING THINGS NEEDED FOR DAILY LIVING?: NO

## 2023-08-11 SDOH — ECONOMIC STABILITY: TRANSPORTATION INSECURITY
IN THE PAST 12 MONTHS, HAS THE LACK OF TRANSPORTATION KEPT YOU FROM MEDICAL APPOINTMENTS OR FROM GETTING MEDICATIONS?: NO

## 2023-08-11 SDOH — ECONOMIC STABILITY: HOUSING INSECURITY
IN THE LAST 12 MONTHS, WAS THERE A TIME WHEN YOU DID NOT HAVE A STEADY PLACE TO SLEEP OR SLEPT IN A SHELTER (INCLUDING NOW)?: NO

## 2023-08-11 SDOH — ECONOMIC STABILITY: INCOME INSECURITY: IN THE LAST 12 MONTHS, WAS THERE A TIME WHEN YOU WERE NOT ABLE TO PAY THE MORTGAGE OR RENT ON TIME?: NO

## 2023-08-11 SDOH — HEALTH STABILITY: PHYSICAL HEALTH: ON AVERAGE, HOW MANY MINUTES DO YOU ENGAGE IN EXERCISE AT THIS LEVEL?: 30 MIN

## 2023-08-11 SDOH — HEALTH STABILITY: PHYSICAL HEALTH: ON AVERAGE, HOW MANY DAYS PER WEEK DO YOU ENGAGE IN MODERATE TO STRENUOUS EXERCISE (LIKE A BRISK WALK)?: 3 DAYS

## 2023-08-11 SDOH — ECONOMIC STABILITY: HOUSING INSECURITY: IN THE LAST 12 MONTHS, HOW MANY PLACES HAVE YOU LIVED?: 1

## 2023-08-11 ASSESSMENT — PATIENT HEALTH QUESTIONNAIRE - PHQ9
SUM OF ALL RESPONSES TO PHQ QUESTIONS 1-9: 0
1. LITTLE INTEREST OR PLEASURE IN DOING THINGS: 0
SUM OF ALL RESPONSES TO PHQ9 QUESTIONS 1 & 2: 0
2. FEELING DOWN, DEPRESSED OR HOPELESS: 0

## 2023-08-11 ASSESSMENT — SOCIAL DETERMINANTS OF HEALTH (SDOH)
DO YOU BELONG TO ANY CLUBS OR ORGANIZATIONS SUCH AS CHURCH GROUPS UNIONS, FRATERNAL OR ATHLETIC GROUPS, OR SCHOOL GROUPS?: YES
WITHIN THE LAST YEAR, HAVE TO BEEN RAPED OR FORCED TO HAVE ANY KIND OF SEXUAL ACTIVITY BY YOUR PARTNER OR EX-PARTNER?: NO
HOW OFTEN DO YOU GET TOGETHER WITH FRIENDS OR RELATIVES?: MORE THAN THREE TIMES A WEEK
HOW OFTEN DO YOU ATTENT MEETINGS OF THE CLUB OR ORGANIZATION YOU BELONG TO?: MORE THAN 4 TIMES PER YEAR
IN A TYPICAL WEEK, HOW MANY TIMES DO YOU TALK ON THE PHONE WITH FAMILY, FRIENDS, OR NEIGHBORS?: MORE THAN THREE TIMES A WEEK
WITHIN THE LAST YEAR, HAVE YOU BEEN KICKED, HIT, SLAPPED, OR OTHERWISE PHYSICALLY HURT BY YOUR PARTNER OR EX-PARTNER?: NO
WITHIN THE LAST YEAR, HAVE YOU BEEN AFRAID OF YOUR PARTNER OR EX-PARTNER?: NO
HOW OFTEN DO YOU ATTEND CHURCH OR RELIGIOUS SERVICES?: MORE THAN 4 TIMES PER YEAR
WITHIN THE LAST YEAR, HAVE YOU BEEN HUMILIATED OR EMOTIONALLY ABUSED IN OTHER WAYS BY YOUR PARTNER OR EX-PARTNER?: NO

## 2023-08-11 ASSESSMENT — ANXIETY QUESTIONNAIRES
4. TROUBLE RELAXING: 0
1. FEELING NERVOUS, ANXIOUS, OR ON EDGE: 0
2. NOT BEING ABLE TO STOP OR CONTROL WORRYING: 0
7. FEELING AFRAID AS IF SOMETHING AWFUL MIGHT HAPPEN: 0
6. BECOMING EASILY ANNOYED OR IRRITABLE: 0
5. BEING SO RESTLESS THAT IT IS HARD TO SIT STILL: 0
IF YOU CHECKED OFF ANY PROBLEMS ON THIS QUESTIONNAIRE, HOW DIFFICULT HAVE THESE PROBLEMS MADE IT FOR YOU TO DO YOUR WORK, TAKE CARE OF THINGS AT HOME, OR GET ALONG WITH OTHER PEOPLE: NOT DIFFICULT AT ALL

## 2023-08-11 ASSESSMENT — LIFESTYLE VARIABLES
HOW OFTEN DO YOU HAVE A DRINK CONTAINING ALCOHOL: NEVER
HOW MANY STANDARD DRINKS CONTAINING ALCOHOL DO YOU HAVE ON A TYPICAL DAY: PATIENT DOES NOT DRINK

## 2023-08-11 NOTE — PROGRESS NOTES
Medicare Annual Wellness Visit    Marley Ware is here for Medicare AWV    Assessment & Plan   Medicare annual wellness visit, subsequent  -     Comprehensive Metabolic Panel; Future  -     Lipid Panel; Future  -     Vitamin D 25 Hydroxy; Future  -     CBC; Future  Chart reviewed and updated as needed. Care gaps discussed. Annual screening labs ordered. Hyperlipidemia, unspecified hyperlipidemia type  -     Lipid Panel; Future  Last lipid panel in 07/2022, elevated. Repeat labs pending. Continue atorvastatin 40mg daily for now. Age-related osteoporosis without current pathological fracture  -     Vitamin D 25 Hydroxy; Future  Vitamin D level pending. Last DEXA in 2022. Continue Fosamax 70mg weekly. Encourage vitamin D/ca supplementation and weight-bearing exercise. Primary hypertension  -     Comprehensive Metabolic Panel; Future  -     Lipid Panel; Future  BP elevated in office. Labs as above. Continue amlodipine 5mg daily, telmisartan 80mg daily. Continue to monitor BP at home. If consistently >140/90, RTC. Encounter for screening mammogram for malignant neoplasm of breast  -     MAXINE DIGITAL SCREEN W OR WO CAD BILATERAL; Future    Recommendations for Preventive Services Due: see orders and patient instructions/AVS.  Recommended screening schedule for the next 5-10 years is provided to the patient in written form: see Patient Instructions/AVS.     Return in about 1 year (around 8/11/2024) for Medicare wellness exam.     Subjective   The following acute and/or chronic problems were also addressed today:    Sleep apnea: Using CPAP. Dr. Debbie Castro at [address] [facility]. Next appointment this month. Glaucoma/cataracts: See Dr. Marilyn Chen twice yearly. Neuropathy/TIA: Sees Dr. Jaky Sevilla in October. Once yearly. Osteoporosis: Taking Fosamax. Takes calcium 1200mg and vitamin D 500mg.      Patient's complete Health Risk Assessment and screening values have been reviewed and are found in

## 2023-08-15 LAB
25(OH)D3+25(OH)D2 SERPL-MCNC: 58.2 NG/ML (ref 30–100)
ALBUMIN SERPL-MCNC: 4.5 G/DL (ref 3.8–4.8)
ALBUMIN/GLOB SERPL: 2 {RATIO} (ref 1.2–2.2)
ALP SERPL-CCNC: 64 IU/L (ref 44–121)
ALT SERPL-CCNC: 14 IU/L (ref 0–32)
AST SERPL-CCNC: 20 IU/L (ref 0–40)
BILIRUB SERPL-MCNC: <0.2 MG/DL (ref 0–1.2)
BUN SERPL-MCNC: 17 MG/DL (ref 8–27)
BUN/CREAT SERPL: 23 (ref 12–28)
CALCIUM SERPL-MCNC: 9.9 MG/DL (ref 8.7–10.3)
CHLORIDE SERPL-SCNC: 101 MMOL/L (ref 96–106)
CHOLEST SERPL-MCNC: 177 MG/DL (ref 100–199)
CO2 SERPL-SCNC: 25 MMOL/L (ref 20–29)
CREAT SERPL-MCNC: 0.75 MG/DL (ref 0.57–1)
EGFRCR SERPLBLD CKD-EPI 2021: 85 ML/MIN/1.73
ERYTHROCYTE [DISTWIDTH] IN BLOOD BY AUTOMATED COUNT: 12.4 % (ref 11.7–15.4)
GLOBULIN SER CALC-MCNC: 2.2 G/DL (ref 1.5–4.5)
GLUCOSE SERPL-MCNC: 102 MG/DL (ref 70–99)
HCT VFR BLD AUTO: 35.2 % (ref 34–46.6)
HDLC SERPL-MCNC: 89 MG/DL
HGB BLD-MCNC: 11.6 G/DL (ref 11.1–15.9)
LDLC SERPL CALC-MCNC: 74 MG/DL (ref 0–99)
MCH RBC QN AUTO: 30.8 PG (ref 26.6–33)
MCHC RBC AUTO-ENTMCNC: 33 G/DL (ref 31.5–35.7)
MCV RBC AUTO: 93 FL (ref 79–97)
PLATELET # BLD AUTO: 342 X10E3/UL (ref 150–450)
POTASSIUM SERPL-SCNC: 4.3 MMOL/L (ref 3.5–5.2)
PROT SERPL-MCNC: 6.7 G/DL (ref 6–8.5)
RBC # BLD AUTO: 3.77 X10E6/UL (ref 3.77–5.28)
SODIUM SERPL-SCNC: 140 MMOL/L (ref 134–144)
TRIGL SERPL-MCNC: 79 MG/DL (ref 0–149)
VLDLC SERPL CALC-MCNC: 14 MG/DL (ref 5–40)
WBC # BLD AUTO: 9.3 X10E3/UL (ref 3.4–10.8)

## 2023-08-18 ENCOUNTER — TRANSCRIBE ORDERS (OUTPATIENT)
Facility: HOSPITAL | Age: 73
End: 2023-08-18

## 2023-08-18 DIAGNOSIS — Z12.31 VISIT FOR SCREENING MAMMOGRAM: Primary | ICD-10-CM

## 2023-10-16 ENCOUNTER — TELEPHONE (OUTPATIENT)
Facility: CLINIC | Age: 73
End: 2023-10-16

## 2023-10-16 DIAGNOSIS — Z12.2 ENCOUNTER FOR SCREENING FOR MALIGNANT NEOPLASM OF LUNG: Primary | ICD-10-CM

## 2023-10-16 DIAGNOSIS — Z87.891 HISTORY OF TOBACCO USE: ICD-10-CM

## 2023-10-16 NOTE — TELEPHONE ENCOUNTER
Patient said she got a letter asking that she needs to do a city scan she is calling for an order to be put in place for the scan.  She said she always has the scan every year

## 2023-11-03 ENCOUNTER — HOSPITAL ENCOUNTER (OUTPATIENT)
Facility: HOSPITAL | Age: 73
End: 2023-11-03
Attending: FAMILY MEDICINE
Payer: MEDICARE

## 2023-11-03 DIAGNOSIS — Z87.891 HISTORY OF TOBACCO USE: ICD-10-CM

## 2023-11-03 DIAGNOSIS — Z12.2 ENCOUNTER FOR SCREENING FOR MALIGNANT NEOPLASM OF LUNG: ICD-10-CM

## 2023-11-03 PROCEDURE — 71271 CT THORAX LUNG CANCER SCR C-: CPT

## 2023-11-27 NOTE — TELEPHONE ENCOUNTER
Last OV:08/11/2023  Next OV:08/12/2024  Last Refill:01/06/2023  Last (labs):08/14/2023    -*Insert any notes here*  Requested Prescriptions     Pending Prescriptions Disp Refills    alendronate (FOSAMAX) 70 MG tablet 4 tablet      Sig: Take 1 tablet by mouth every 7 days

## 2023-11-29 RX ORDER — ALENDRONATE SODIUM 70 MG/1
70 TABLET ORAL
Qty: 12 TABLET | Refills: 3 | Status: SHIPPED | OUTPATIENT
Start: 2023-11-29

## 2023-12-11 ENCOUNTER — HOSPITAL ENCOUNTER (OUTPATIENT)
Facility: HOSPITAL | Age: 73
Discharge: HOME | End: 2023-12-14
Attending: FAMILY MEDICINE
Payer: MEDICARE

## 2023-12-11 DIAGNOSIS — Z12.31 VISIT FOR SCREENING MAMMOGRAM: ICD-10-CM

## 2023-12-11 PROCEDURE — 77063 BREAST TOMOSYNTHESIS BI: CPT

## 2024-02-19 NOTE — TELEPHONE ENCOUNTER
Patient called requesting a refill on the following:    - Amlogidine 5mg  -Telmisartan 80mg    [facility]    Patient can be reached at: [phone]    Patient last seen: 8/11/23

## 2024-02-19 NOTE — TELEPHONE ENCOUNTER
Requested Prescriptions     Pending Prescriptions Disp Refills    amLODIPine (NORVASC) 5 MG tablet 30 tablet      Sig: Take 1 tablet by mouth daily    telmisartan (MICARDIS) 80 MG tablet 30 tablet      Sig: Take 1 tablet by mouth daily

## 2024-02-22 RX ORDER — AMLODIPINE BESYLATE 5 MG/1
5 TABLET ORAL DAILY
Qty: 90 TABLET | Refills: 1 | Status: SHIPPED | OUTPATIENT
Start: 2024-02-22

## 2024-02-22 RX ORDER — TELMISARTAN 80 MG/1
80 TABLET ORAL DAILY
Qty: 90 TABLET | Refills: 1 | Status: SHIPPED | OUTPATIENT
Start: 2024-02-22

## 2024-02-22 NOTE — TELEPHONE ENCOUNTER
Patient called about prior message medications.  This weekend pt will be out of medication.  Please call patient at [phone].  Thank you

## 2024-03-16 NOTE — PROGRESS NOTES
ID:  Jaclyn Rizo. Dean Pearson , 72 y.o., female      CHIEF COMPLAINT:   Chief Complaint   Patient presents with    Physical    Medication Question     wants to discuss Fosamax              HISTORY OF PRESENT ILLNESS:    Charissa Rogers is a 72 y.o. female with the below listed medical problems whom comes in today for follow-up yearly. She complains of a cough that is ongoing. She cannot get the mucus up. She is not try anything because she \"did not know what to try with her medical problems \". She has no fever, chills, sweats, loss of smell. Is been going for about 4 or 5 weeks. She is not short of breath. She denies chest pain. She does ask about the alendronate and says her dentist voiced some concern over its usage. Is been about 3 years since she has started this. She has no particular problems per se such as jaw pain or stomach problems reflux indigestion black stools. PAST MEDICAL HISTORY:   Patient Active Problem List   Diagnosis Code    Hypertension I10    Hyperlipidemia E78.5    Osteoporosis M81.0        ALLERGIES:   No Known Allergies      MEDICATIONS:     Current Outpatient Medications:     atorvastatin (LIPITOR) 20 mg tablet, Take  by mouth daily. , Disp: , Rfl:     aspirin (ASPIR-81 PO), Take  by mouth., Disp: , Rfl:     ubidecarenone (CO Q-10 PO), Take  by mouth., Disp: , Rfl:     ascorbic acid (VITAMIN C PO), Take  by mouth., Disp: , Rfl:     multivitamin (ONE A DAY) tablet, Take 1 Tab by mouth daily. , Disp: , Rfl:     guaiFENesin ER (MUCINEX) 600 mg ER tablet, Take 1 Tab by mouth two (2) times a day., Disp: 60 Tab, Rfl: 2    telmisartan-hydroCHLOROthiazide (MICARDIS HCT) 40-12.5 mg per tablet, Take 1 Tab by mouth daily. , Disp: 30 Tab, Rfl: 2    SOCIAL:   Social History     Tobacco Use    Smoking status: Former Smoker     Last attempt to quit: 2020     Years since quittin.1    Smokeless tobacco: Never Used   Substance Use Topics    Alcohol use:  Yes    Drug use: Never SURGERIES:   Past Surgical History:   Procedure Laterality Date    HX COLONOSCOPY      due 2019 but due to COVID-19 will wait          FAMILY HX:  History reviewed. No pertinent family history. Review of systems:   I personally collected this information from all available source present (patient/others in room and records available) -[email or web address]  Review of Systems   Constitutional: Negative for activity change, appetite change, chills, diaphoresis, fever and unexpected weight change. HENT: Negative for congestion, ear discharge, ear pain, hearing loss, rhinorrhea, sinus pressure, sneezing, sore throat, tinnitus, trouble swallowing and voice change. Eyes: Negative for photophobia, pain, discharge and visual disturbance. Respiratory: Positive for cough. Negative for chest tightness, shortness of breath and wheezing. Cardiovascular: Negative for chest pain, palpitations and leg swelling. Gastrointestinal: Negative for abdominal distention, abdominal pain, blood in stool, constipation, diarrhea, nausea and vomiting. Endocrine: Negative for cold intolerance, heat intolerance, polydipsia and polyphagia. Genitourinary: Negative for difficulty urinating, dysuria, frequency, hematuria and urgency. Musculoskeletal: Positive for arthralgias and back pain. Negative for gait problem, joint swelling, myalgias and neck pain. Skin: Negative for color change and rash. Neurological: Negative for dizziness, tremors, syncope, speech difficulty, weakness, light-headedness, numbness and headaches. Hematological: Negative for adenopathy. Does not bruise/bleed easily. Psychiatric/Behavioral: Negative for agitation, behavioral problems, confusion, decreased concentration, dysphoric mood, hallucinations, sleep disturbance and suicidal ideas. The patient is not nervous/anxious.            VITAL SIGNS:   Visit Vitals  BP (!) 184/71 (BP 1 Location: Left arm, BP Patient Position: Sitting)   Pulse 92   Temp 97.3 °F (36.3 °C) (Temporal)   Ht 5' 5\" (1.651 m)   Wt 125 lb (56.7 kg)   SpO2 95%   BMI 20.80 kg/m²           PHYSICAL EXAMINATION:  Physical Exam  Nursing note reviewed. Constitutional:       General: She is not in acute distress. Appearance: Normal appearance. She is not ill-appearing or toxic-appearing. HENT:      Right Ear: Tympanic membrane, ear canal and external ear normal.      Left Ear: Tympanic membrane, ear canal and external ear normal.      Nose: Congestion (nares boggy and erythematous) and rhinorrhea present. Mouth/Throat:      Pharynx: Posterior oropharyngeal erythema (posterior pharynx hyperemic with copious thick pnd) present. No oropharyngeal exudate. Eyes:      General: No scleral icterus. Extraocular Movements: Extraocular movements intact. Conjunctiva/sclera: Conjunctivae normal.      Pupils: Pupils are equal, round, and reactive to light. Neck:      Musculoskeletal: No neck rigidity or muscular tenderness. Vascular: No carotid bruit. Cardiovascular:      Rate and Rhythm: Normal rate and regular rhythm. Heart sounds: No murmur. No friction rub. No gallop. Pulmonary:      Effort: Pulmonary effort is normal.      Breath sounds: Normal breath sounds. No wheezing, rhonchi or rales. Abdominal:      General: Bowel sounds are normal. There is no distension. Palpations: Abdomen is soft. There is no mass. Tenderness: There is no abdominal tenderness. Musculoskeletal:         General: No swelling, tenderness or deformity. Right lower leg: No edema. Left lower leg: No edema. Lymphadenopathy:      Cervical: No cervical adenopathy. Skin:     Capillary Refill: Capillary refill takes less than 2 seconds. Coloration: Skin is not jaundiced. Findings: No erythema or rash. Neurological:      General: No focal deficit present. Mental Status: She is alert and oriented to person, place, and time. Mental status is at baseline. Cranial Nerves: No cranial nerve deficit. Sensory: No sensory deficit. Coordination: Coordination normal.      Gait: Gait normal.   Psychiatric:         Mood and Affect: Mood normal.         Behavior: Behavior normal.         Thought Content: Thought content normal.         Judgment: Judgment normal.         ASSESSMENT/PLAN:    The following diagnoses were identified today, reviewed and discussed. 1. Essential hypertension    - CBC WITH AUTOMATED DIFF  - METABOLIC PANEL, COMPREHENSIVE  - LIPID PANEL  - URINALYSIS W/ RFLX MICROSCOPIC  - TSH 3RD GENERATION    2. Mixed hyperlipidemia    - METABOLIC PANEL, COMPREHENSIVE  - LIPID PANEL  - TSH 3RD GENERATION    3. Osteoporosis, unspecified osteoporosis type, unspecified pathological fracture presence    Today we will go ahead and stop her alendronate. We will check a bone density next year. This will give her about 12 months off of the medicine. 4. Screening mammogram, encounter for    - [facility] MAMMO BI SCREENING INCL CAD; Future    5. Cough    We will start with the guaifenesin and check a chest x-ray. However, if there is any changes she is to let us know immediately. - guaiFENesin ER (MUCINEX) 600 mg ER tablet; Take 1 Tab by mouth two (2) times a day. Dispense: 60 Tab; Refill: 2  - XR CHEST PA LAT; Future          Discussion:    The patient and I discussed the following items/issues; Each diagnosis listed for today's visit including medications, treatment, testing such as labs, imagine, referrals and when to call regarding results and appointments. Reminded patient to keep any and all appointments with specialists, labs, imaging. Reminded patient to make sure we get copies of any specialists care, labs and imaging. Reminded patient to call of come by the office if there are any concerns, questions , comments or problems.     The patient verbalized understanding of the care plan and all questions were answered to the patient's satisfaction prior to leaving the office. The patient was told that failure to comply with recommended testing could result in abnormal health consequences. The patient was instructed to have yearly routine health maintenance including but not limited to age appropriate vaccines, testing, screening exams. The patient actively participated in medical decision making. FOLLOW UP:   Patient knows to keep any and all future visits scheduled unless told otherwise. Patient knows to call, come back if any concerns, questions, comments or problems arise. Follow-up and Dispositions    · Return in about 6 months (around 5/9/2021) for Follow up lipids, bp and copd. This visit may have been completed , in part, with voice recognition software as well as typing and may have syntax errors despite editing.       Estella Vines, DO Home

## 2024-07-18 DIAGNOSIS — E78.5 HYPERLIPIDEMIA, UNSPECIFIED HYPERLIPIDEMIA TYPE: ICD-10-CM

## 2024-07-18 NOTE — TELEPHONE ENCOUNTER
Requested Prescriptions     Pending Prescriptions Disp Refills    atorvastatin (LIPITOR) 40 MG tablet 90 tablet 3     Sig: Take 1 tablet by mouth daily

## 2024-07-18 NOTE — TELEPHONE ENCOUNTER
Diagnosis: breast cancer     Agent/Regimen:  Adjuvant capecitabine (Xeloda) 2000mg BID x14 on / 7 off x8 cycles total  Cycle:  4    Per Dr. Kerr: patient to HOLD Xeloda x1 additional week.  Plan to start C5 on 8/3/2023.    Is this the first follow-up call to monitor the start of oral chemo? No    Patient has viewed all assigned Patient Education videos. Yes    Reviewed and verified Advanced Directives: No: Patient declined to create/provide document at this time     Nursing Assessment: A focused nursing assessment addressing the toxicity of oral chemotherapy was performed and the patient reports the following:     Anxiety/Depression/Insomnia: Anxiety: No, Depression: No and Insomnia: No  Pain: NO    Toxicity Assessment    See review of systems completed by SHONDA Nobles and reviewed by Dr. Kerr at the time of the visit.      Adverse Events?  Adverse Events: No    Auditory/Ear  Assessment: Yes (Within Defined Limits)    Cardiac General  Assessment: Yes (Within Defined Limits)    Constitutional  Assessment: Yes (Within Defined Limits)    Dermatology/Skin  Assessment: Yes (Within Defined Limits)    Endocrine  Assessment: Yes (Within Defined Limits)    Gastrointestinal  Assessment: Yes (w/ Exceptions to WDL)  Mucositis Oral: Grade 2    Hemorrhage/Bleeding  Assessment: Yes (Within Defined Limits)    Infection  Assessment: Yes (w/ Exceptions to WDL)    Lymphatics  Assessment: Yes (Within Defined Limits)    Musculoskeletal  Assessment: Yes (Within Defined Limits)    Neurology  Assessment: Yes (Within Defined Limits)    Ocular  Assessment: Yes (Within Defined Limits)    Pain  Assessment: Yes (Within Defined Limits)  Pain: None Present    Pulmonary/Upper Respiratory  Assessment: Yes (Within Defined Limits)    Genitourinary  Assessment: Yes (Within Defined Limits)        Patient confirms receipt of a signed copy of Anti-Cancer Treatment consent and verbalizes understanding of treatment plan: Yes.    Treatment Plan: -  Patient called requesting a [facility] be [facility] for the following:    Atorvastatin 40 mg  1 tab daily  #90   Treatment consent signed  - Patient has valid pre-authorization  - Prescription refilled NA  - Comments: Refill for C5 already provided on 7/12/2023    Adherence: Discussed oral chemo adherence with patient. Patient taking medication as prescribed.        Next appointment scheduled:   Last office visit: 7/5/2023  Next office visit: 8/24/2023    Patient instructed to call the office with any questions or concerns.    This patient is being assessed during a(n)   in person visit.  ECOG:   ECOG [07/27/23 1545]   ECOG Performance Status 0       Pt discharged to patient discharged to home per self, ambulatory

## 2024-07-19 RX ORDER — ATORVASTATIN CALCIUM 40 MG/1
40 TABLET, FILM COATED ORAL DAILY
Qty: 90 TABLET | Refills: 1 | Status: SHIPPED | OUTPATIENT
Start: 2024-07-19

## 2024-08-09 SDOH — ECONOMIC STABILITY: INCOME INSECURITY: HOW HARD IS IT FOR YOU TO PAY FOR THE VERY BASICS LIKE FOOD, HOUSING, MEDICAL CARE, AND HEATING?: PATIENT DECLINED

## 2024-08-09 SDOH — ECONOMIC STABILITY: FOOD INSECURITY: WITHIN THE PAST 12 MONTHS, YOU WORRIED THAT YOUR FOOD WOULD RUN OUT BEFORE YOU GOT MONEY TO BUY MORE.: PATIENT DECLINED

## 2024-08-09 SDOH — ECONOMIC STABILITY: FOOD INSECURITY: WITHIN THE PAST 12 MONTHS, THE FOOD YOU BOUGHT JUST DIDN'T LAST AND YOU DIDN'T HAVE MONEY TO GET MORE.: NEVER TRUE

## 2024-08-09 SDOH — HEALTH STABILITY: PHYSICAL HEALTH: ON AVERAGE, HOW MANY MINUTES DO YOU ENGAGE IN EXERCISE AT THIS LEVEL?: 50 MIN

## 2024-08-09 SDOH — HEALTH STABILITY: PHYSICAL HEALTH: ON AVERAGE, HOW MANY DAYS PER WEEK DO YOU ENGAGE IN MODERATE TO STRENUOUS EXERCISE (LIKE A BRISK WALK)?: 5 DAYS

## 2024-08-09 ASSESSMENT — LIFESTYLE VARIABLES
HOW MANY STANDARD DRINKS CONTAINING ALCOHOL DO YOU HAVE ON A TYPICAL DAY: PATIENT DOES NOT DRINK
HOW OFTEN DO YOU HAVE A DRINK CONTAINING ALCOHOL: NEVER
HOW MANY STANDARD DRINKS CONTAINING ALCOHOL DO YOU HAVE ON A TYPICAL DAY: 0
HOW OFTEN DO YOU HAVE A DRINK CONTAINING ALCOHOL: 1
HOW OFTEN DO YOU HAVE SIX OR MORE DRINKS ON ONE OCCASION: 1

## 2024-08-09 ASSESSMENT — PATIENT HEALTH QUESTIONNAIRE - PHQ9
1. LITTLE INTEREST OR PLEASURE IN DOING THINGS: NOT AT ALL
SUM OF ALL RESPONSES TO PHQ QUESTIONS 1-9: 0
SUM OF ALL RESPONSES TO PHQ9 QUESTIONS 1 & 2: 0
2. FEELING DOWN, DEPRESSED OR HOPELESS: NOT AT ALL
SUM OF ALL RESPONSES TO PHQ QUESTIONS 1-9: 0

## 2024-08-12 ENCOUNTER — OFFICE VISIT (OUTPATIENT)
Facility: CLINIC | Age: 74
End: 2024-08-12
Payer: MEDICARE

## 2024-08-12 VITALS
HEART RATE: 76 BPM | RESPIRATION RATE: 18 BRPM | WEIGHT: 124 LBS | OXYGEN SATURATION: 96 % | TEMPERATURE: 97.7 F | SYSTOLIC BLOOD PRESSURE: 135 MMHG | BODY MASS INDEX: 21.17 KG/M2 | DIASTOLIC BLOOD PRESSURE: 67 MMHG | HEIGHT: 64 IN

## 2024-08-12 DIAGNOSIS — I10 PRIMARY HYPERTENSION: ICD-10-CM

## 2024-08-12 DIAGNOSIS — Z00.00 MEDICARE ANNUAL WELLNESS VISIT, SUBSEQUENT: ICD-10-CM

## 2024-08-12 DIAGNOSIS — Z86.73 HISTORY OF TIA (TRANSIENT ISCHEMIC ATTACK): ICD-10-CM

## 2024-08-12 DIAGNOSIS — Z12.31 ENCOUNTER FOR SCREENING MAMMOGRAM FOR MALIGNANT NEOPLASM OF BREAST: ICD-10-CM

## 2024-08-12 DIAGNOSIS — J43.8 OTHER EMPHYSEMA (HCC): ICD-10-CM

## 2024-08-12 DIAGNOSIS — Z00.00 MEDICARE ANNUAL WELLNESS VISIT, SUBSEQUENT: Primary | ICD-10-CM

## 2024-08-12 DIAGNOSIS — M81.0 AGE-RELATED OSTEOPOROSIS WITHOUT CURRENT PATHOLOGICAL FRACTURE: ICD-10-CM

## 2024-08-12 DIAGNOSIS — E78.5 HYPERLIPIDEMIA, UNSPECIFIED HYPERLIPIDEMIA TYPE: ICD-10-CM

## 2024-08-12 DIAGNOSIS — F41.9 ANXIETY: ICD-10-CM

## 2024-08-12 DIAGNOSIS — Z12.11 COLON CANCER SCREENING: ICD-10-CM

## 2024-08-12 PROBLEM — E78.01 FAMILIAL HYPERCHOLESTEROLEMIA: Status: RESOLVED | Noted: 2023-08-11 | Resolved: 2024-08-12

## 2024-08-12 PROBLEM — H25.10 NUCLEAR SENILE CATARACT: Status: RESOLVED | Noted: 2021-01-04 | Resolved: 2024-08-12

## 2024-08-12 PROBLEM — H52.229 REGULAR ASTIGMATISM: Status: RESOLVED | Noted: 2023-08-11 | Resolved: 2024-08-12

## 2024-08-12 PROBLEM — H61.23 IMPACTED CERUMEN, BILATERAL: Status: RESOLVED | Noted: 2023-08-11 | Resolved: 2024-08-12

## 2024-08-12 PROBLEM — H52.4 PRESBYOPIA: Status: RESOLVED | Noted: 2023-08-11 | Resolved: 2024-08-12

## 2024-08-12 PROBLEM — H40.019 OPEN ANGLE WITH BORDERLINE FINDINGS, LOW RISK, UNSPECIFIED EYE: Status: RESOLVED | Noted: 2023-08-11 | Resolved: 2024-08-12

## 2024-08-12 PROBLEM — H40.059 OCULAR HYPERTENSION: Status: RESOLVED | Noted: 2023-08-11 | Resolved: 2024-08-12

## 2024-08-12 PROCEDURE — 1036F TOBACCO NON-USER: CPT | Performed by: FAMILY MEDICINE

## 2024-08-12 PROCEDURE — 3075F SYST BP GE 130 - 139MM HG: CPT | Performed by: FAMILY MEDICINE

## 2024-08-12 PROCEDURE — 1090F PRES/ABSN URINE INCON ASSESS: CPT | Performed by: FAMILY MEDICINE

## 2024-08-12 PROCEDURE — G8427 DOCREV CUR MEDS BY ELIG CLIN: HCPCS | Performed by: FAMILY MEDICINE

## 2024-08-12 PROCEDURE — G8420 CALC BMI NORM PARAMETERS: HCPCS | Performed by: FAMILY MEDICINE

## 2024-08-12 PROCEDURE — 1123F ACP DISCUSS/DSCN MKR DOCD: CPT | Performed by: FAMILY MEDICINE

## 2024-08-12 PROCEDURE — 3023F SPIROM DOC REV: CPT | Performed by: FAMILY MEDICINE

## 2024-08-12 PROCEDURE — G8399 PT W/DXA RESULTS DOCUMENT: HCPCS | Performed by: FAMILY MEDICINE

## 2024-08-12 PROCEDURE — 3078F DIAST BP <80 MM HG: CPT | Performed by: FAMILY MEDICINE

## 2024-08-12 PROCEDURE — G0439 PPPS, SUBSEQ VISIT: HCPCS | Performed by: FAMILY MEDICINE

## 2024-08-12 PROCEDURE — 3017F COLORECTAL CA SCREEN DOC REV: CPT | Performed by: FAMILY MEDICINE

## 2024-08-12 PROCEDURE — 99214 OFFICE O/P EST MOD 30 MIN: CPT | Performed by: FAMILY MEDICINE

## 2024-08-12 RX ORDER — AMLODIPINE BESYLATE 5 MG/1
5 TABLET ORAL DAILY
Qty: 90 TABLET | Refills: 1 | Status: SHIPPED | OUTPATIENT
Start: 2024-08-12

## 2024-08-12 RX ORDER — TELMISARTAN 80 MG/1
80 TABLET ORAL DAILY
Qty: 90 TABLET | Refills: 1 | Status: SHIPPED | OUTPATIENT
Start: 2024-08-12

## 2024-08-12 RX ORDER — ALENDRONATE SODIUM 70 MG/1
70 TABLET ORAL
Qty: 12 TABLET | Refills: 3 | Status: SHIPPED | OUTPATIENT
Start: 2024-08-12

## 2024-08-12 SDOH — ECONOMIC STABILITY: FOOD INSECURITY: WITHIN THE PAST 12 MONTHS, YOU WORRIED THAT YOUR FOOD WOULD RUN OUT BEFORE YOU GOT MONEY TO BUY MORE.: NEVER TRUE

## 2024-08-12 SDOH — ECONOMIC STABILITY: INCOME INSECURITY: HOW HARD IS IT FOR YOU TO PAY FOR THE VERY BASICS LIKE FOOD, HOUSING, MEDICAL CARE, AND HEATING?: NOT HARD AT ALL

## 2024-08-12 SDOH — ECONOMIC STABILITY: FOOD INSECURITY: WITHIN THE PAST 12 MONTHS, THE FOOD YOU BOUGHT JUST DIDN'T LAST AND YOU DIDN'T HAVE MONEY TO GET MORE.: NEVER TRUE

## 2024-08-12 NOTE — PATIENT INSTRUCTIONS
Learning About Vision Tests  What are vision tests?     The four most common vision tests are visual acuity tests, refraction, visual field tests, and color vision tests.  Visual acuity (sharpness) tests  These tests are used:  To see if you need glasses or contact lenses.  To monitor an eye problem.  To check an eye injury.  Visual acuity tests are done as part of routine exams. You may also have this test when you get your 's license or apply for some types of jobs.  Visual field tests  These tests are used:  To check for vision loss in any area of your range of vision.  To screen for certain eye diseases.  To look for nerve damage after a stroke, head injury, or other problem that could reduce blood flow to the brain.  Refraction and color tests  A refraction test is done to find the right prescription for glasses and contact lenses.  A color vision test is done to check for color blindness.  Color vision is often tested as part of a routine exam. You may also have this test when you apply for a job where recognizing different colors is important, such as , electronics, or the .  How are vision tests done?  Visual acuity test   You cover one eye at a time.  You read aloud from a wall chart across the room.  You read aloud from a small card that you hold in your hand.  Refraction   You look into a special device.  The device puts lenses of different strengths in front of each eye to see how strong your glasses or contact lenses need to be.  Visual field tests   Your doctor may have you look through special machines.  Or your doctor may simply have you stare straight ahead while they move a finger into and out of your field of vision.  Color vision test   You look at pieces of printed test patterns in various colors. You say what number or symbol you see.  Your doctor may have you trace the number or symbol using a pointer.  How do these tests feel?  There is very little chance of having  is never too late to quit. Try to avoid secondhand smoke too.     Stay at a weight that's healthy for you. Talk to your doctor if you need help losing weight.     Try to get 7 to 9 hours of sleep each night.     Limit alcohol to 2 drinks a day for men and 1 drink a day for women. Too much alcohol can cause health problems.     Manage other health problems such as diabetes, high blood pressure, and high cholesterol. If you think you may have a problem with alcohol or drug use, talk to your doctor.   Medicines    Take your medicines exactly as prescribed. Call your doctor if you think you are having a problem with your medicine.     If your doctor recommends aspirin, take the amount directed each day. Make sure you take aspirin and not another kind of pain reliever, such as acetaminophen (Tylenol).   When should you call for help?   Call 911 if you have symptoms of a heart attack. These may include:    Chest pain or pressure, or a strange feeling in the chest.     Sweating.     Shortness of breath.     Pain, pressure, or a strange feeling in the back, neck, jaw, or upper belly or in one or both shoulders or arms.     Lightheadedness or sudden weakness.     A fast or irregular heartbeat.   After you call 911, the  may tell you to chew 1 adult-strength or 2 to 4 low-dose aspirin. Wait for an ambulance. Do not try to drive yourself.  Watch closely for changes in your health, and be sure to contact your doctor if you have any problems.  Where can you learn more?  Go to https://www.[employer].net/patientEd and enter F075 to learn more about \"A Healthy Heart: Care Instructions.\"  Current as of: June 24, 2023  Content Version: 14.1  © 8890-0583 [employer].   Care instructions adapted under license by [employer]. If you have questions about a medical condition or this instruction, always ask your healthcare professional. [employer] disclaims any warranty or liability for your use of this

## 2024-08-12 NOTE — PROGRESS NOTES
Medicare Annual Wellness Visit    Shelly Severino is here for Medicare AWV    Assessment & Plan   Medicare annual wellness visit, subsequent  -     Comprehensive Metabolic Panel; Future  -     CBC; Future  -     Lipid Panel; Future    Colon cancer screening  -     Amb External Referral To Gastroenterology    Encounter for screening mammogram for malignant neoplasm of breast  -     MAXINE DIGITAL SCREEN W OR WO CAD BILATERAL; Future    Primary hypertension  -     telmisartan (MICARDIS) 80 MG tablet; Take 1 tablet by mouth daily, Disp-90 tablet, R-1Normal  -     amLODIPine (NORVASC) 5 MG tablet; Take 1 tablet by mouth daily, Disp-90 tablet, R-1Normal  Well controlled. Refills sent to pharmacy.     Other emphysema (HCC)  Stable. No inhalers currently.     Hyperlipidemia, unspecified hyperlipidemia type  -     Lipid Panel; Future  Continue atorvastatin 40mg daily for now.     History of TIA (transient ischemic attack)  Will need to inquire about ASA at follow up. Continue statin.     Anxiety  Stable.     Age-related osteoporosis without current pathological fracture  -     alendronate (FOSAMAX) 70 MG tablet; Take 1 tablet by mouth every 7 days, Disp-12 tablet, R-3Normal  -     Comprehensive Metabolic Panel; Future  -     Vitamin D 25 Hydroxy; Future  -     DEXA BONE DENSITY AXIAL SKELETON; Future  Repeat DEXA ordered. Refill Fosamax (start date 01/2023). Labs as above.       Recommendations for Preventive Services Due: see orders and patient instructions/AVS.  Recommended screening schedule for the next 5-10 years is provided to the patient in written form: see Patient Instructions/AVS.     Return in about 3 weeks (around 9/2/2024) for Nurse's visit for bilateral ear irrigation.     Subjective     Exercise: Does video workouts 5 days per week which includes cardio and body weight exercises.   Vaccines: Due for PCV20  Specialists: [facility], Dr. Montano (Ophthalmology), Dr. Saucedo (Neurology)  Colon  cancer screening: Last colonoscopy about 10 years ago. Agreable to GI referral.   Breast cancer screening: Last mammogram one year ago.     Osteoporosis: Fosamax initiated in 01/2023. Last DEXA in 2022. Requesting refills of Fosamax. Also taking vitamin D supplement, she thinks 500mg with calcium 1200mg.     Peripheral neuropathy: Saw Dr. Saucedo a few months ago. Typically sees him once yearly. HE suggest medication but pateint deferred.     Patient's complete Health Risk Assessment and screening values have been reviewed and are found in Flowsheets. The following problems were reviewed today and where indicated follow up appointments were made and/or referrals ordered.    Positive Risk Factor Screenings with Interventions:               General HRA Questions:  Select all that apply: (!) New or Increased Pain  Interventions - Pain:  Patient advised to follow up in the office for further evaluation and treatment           Vision Screen:  Interventions:   See AVS for additional education material    Safety:  Do you have non-slip mats or non-slip surfaces or shower bars or grab bars in your shower or bathtub?: (!) No  Interventions:  See AVS for additional education material               Objective   Vitals:    08/12/24 1026   BP: 135/67   Site: Right Upper Arm   Position: Sitting   Cuff Size: Large Adult   Pulse: 76   Resp: 18   Temp: 97.7 °F (36.5 °C)   TempSrc: Temporal   SpO2: 96%   Weight: 56.2 kg (124 lb)   Height: 1.626 m (5' 4\")      Body mass index is 21.28 kg/m².        General Appearance: alert and oriented to person, place and time, well developed and well- nourished, in no acute distress  Skin: warm and dry, no rash or erythema  Head: normocephalic and atraumatic  Eyes: extraocular eye movements intact, conjunctivae normal  Pulmonary/Chest: clear to auscultation bilaterally- no wheezes, rales or rhonchi, normal air movement, no respiratory distress  Cardiovascular: normal rate, regular rhythm, normal S1 and

## 2024-08-12 NOTE — PROGRESS NOTES
\"Have you been to the ER, urgent care clinic since your last visit?  Hospitalized since your last visit?\"    NO    “Have you seen or consulted any other health care providers outside of [facility] since your last visit?”    NO        “Have you had a colorectal cancer screening such as a colonoscopy/FIT/Cologuard?    NO    No colonoscopy on file  No cologuard on file  No FIT/FOBT on file   No flexible sigmoidoscopy on file     Chief Complaint   Patient presents with    Medicare AWV     /67 (Site: Right Upper Arm, Position: Sitting, Cuff Size: Large Adult)   Pulse 76   Temp 97.7 °F (36.5 °C) (Temporal)   Resp 18   Ht 1.626 m (5' 4\")   Wt 56.2 kg (124 lb)   SpO2 96%   BMI 21.28 kg/m²       Click Here for Release of Records Request

## 2024-08-13 LAB
25(OH)D3+25(OH)D2 SERPL-MCNC: 62.8 NG/ML (ref 30–100)
ALBUMIN SERPL-MCNC: 4.6 G/DL (ref 3.8–4.8)
ALP SERPL-CCNC: 54 IU/L (ref 44–121)
ALT SERPL-CCNC: 14 IU/L (ref 0–32)
AST SERPL-CCNC: 19 IU/L (ref 0–40)
BILIRUB SERPL-MCNC: 0.3 MG/DL (ref 0–1.2)
BUN SERPL-MCNC: 15 MG/DL (ref 8–27)
BUN/CREAT SERPL: 21 (ref 12–28)
CALCIUM SERPL-MCNC: 9.7 MG/DL (ref 8.7–10.3)
CHLORIDE SERPL-SCNC: 96 MMOL/L (ref 96–106)
CHOLEST SERPL-MCNC: 196 MG/DL (ref 100–199)
CO2 SERPL-SCNC: 26 MMOL/L (ref 20–29)
CREAT SERPL-MCNC: 0.71 MG/DL (ref 0.57–1)
EGFRCR SERPLBLD CKD-EPI 2021: 90 ML/MIN/1.73
ERYTHROCYTE [DISTWIDTH] IN BLOOD BY AUTOMATED COUNT: 12 % (ref 11.7–15.4)
GLOBULIN SER CALC-MCNC: 1.8 G/DL (ref 1.5–4.5)
GLUCOSE SERPL-MCNC: 104 MG/DL (ref 70–99)
HCT VFR BLD AUTO: 35.1 % (ref 34–46.6)
HDLC SERPL-MCNC: 89 MG/DL
HGB BLD-MCNC: 11.6 G/DL (ref 11.1–15.9)
LDLC SERPL CALC-MCNC: 90 MG/DL (ref 0–99)
MCH RBC QN AUTO: 30.3 PG (ref 26.6–33)
MCHC RBC AUTO-ENTMCNC: 33 G/DL (ref 31.5–35.7)
MCV RBC AUTO: 92 FL (ref 79–97)
PLATELET # BLD AUTO: 329 X10E3/UL (ref 150–450)
POTASSIUM SERPL-SCNC: 4.8 MMOL/L (ref 3.5–5.2)
PROT SERPL-MCNC: 6.4 G/DL (ref 6–8.5)
RBC # BLD AUTO: 3.83 X10E6/UL (ref 3.77–5.28)
SODIUM SERPL-SCNC: 134 MMOL/L (ref 134–144)
TRIGL SERPL-MCNC: 100 MG/DL (ref 0–149)
VLDLC SERPL CALC-MCNC: 17 MG/DL (ref 5–40)
WBC # BLD AUTO: 8.4 X10E3/UL (ref 3.4–10.8)

## 2024-08-15 ENCOUNTER — TRANSCRIBE ORDERS (OUTPATIENT)
Facility: HOSPITAL | Age: 74
End: 2024-08-15

## 2024-08-15 DIAGNOSIS — Z12.31 VISIT FOR SCREENING MAMMOGRAM: Primary | ICD-10-CM

## 2024-08-16 ENCOUNTER — TELEPHONE (OUTPATIENT)
Facility: CLINIC | Age: 74
End: 2024-08-16

## 2024-09-04 ENCOUNTER — NURSE ONLY (OUTPATIENT)
Facility: CLINIC | Age: 74
End: 2024-09-04
Payer: MEDICARE

## 2024-09-04 DIAGNOSIS — H61.23 IMPACTED CERUMEN, BILATERAL: Primary | ICD-10-CM

## 2024-09-04 PROCEDURE — 99211 OFF/OP EST MAY X REQ PHY/QHP: CPT | Performed by: FAMILY MEDICINE

## 2024-10-07 ENCOUNTER — TELEPHONE (OUTPATIENT)
Facility: CLINIC | Age: 74
End: 2024-10-07

## 2024-10-07 DIAGNOSIS — Z87.891 PERSONAL HISTORY OF TOBACCO USE: Primary | ICD-10-CM

## 2024-10-07 DIAGNOSIS — Z12.2 SCREENING FOR LUNG CANCER: ICD-10-CM

## 2024-10-07 NOTE — TELEPHONE ENCOUNTER
Is this requesting for an order of some sort?  [x]Yes    []No  If Yes, what is being requested?  Referral for CT for lung cancer screening    Is the patient calling about a new issue (illness, pain, etc)?  []Yes    [x]No  If yes, when did this specific issue start?      ENC Note: Patient states she received a message from Dr. Campo wanting her to have a CT for lung cancer screening.  Patient wanted to know if referral had been done and where to have this done.  Please advise patient at [phone].      Best Contact number: [phone]

## 2024-10-09 ENCOUNTER — TELEPHONE (OUTPATIENT)
Facility: CLINIC | Age: 74
End: 2024-10-09

## 2024-10-09 NOTE — TELEPHONE ENCOUNTER
Is this requesting for an order of some sort?  [x]Yes    []No  If Yes, what is being requested?  CT Lung screen    Is the patient calling about a new issue (illness, pain, etc)?  []Yes    [x]No  If yes, when did this specific issue start?      ENC Note:  Pt called [facility] central scheduling to schedule this test but there was no order in the system.  Please call patient if this needs to be completed once order has been put in Epic.      Best Contact number:  [phone]

## 2024-10-22 NOTE — PRE-PROCEDURE INSTRUCTIONS
Attempted to complete pat for procedure on 10/25/24. No answer. Detailed voicemail left regarding arrival time of 0815, npo status, prep instructions, and the need for a ride home. Callback number of [phone] left for any questions.

## 2024-10-25 ENCOUNTER — ANESTHESIA EVENT (OUTPATIENT)
Facility: HOSPITAL | Age: 74
End: 2024-10-25
Payer: MEDICARE

## 2024-10-25 ENCOUNTER — HOSPITAL ENCOUNTER (OUTPATIENT)
Facility: HOSPITAL | Age: 74
Setting detail: OUTPATIENT SURGERY
Discharge: HOME | End: 2024-10-25
Attending: INTERNAL MEDICINE | Admitting: INTERNAL MEDICINE
Payer: MEDICARE

## 2024-10-25 ENCOUNTER — ANESTHESIA (OUTPATIENT)
Facility: HOSPITAL | Age: 74
End: 2024-10-25
Payer: MEDICARE

## 2024-10-25 VITALS
TEMPERATURE: 97.2 F | WEIGHT: 122 LBS | SYSTOLIC BLOOD PRESSURE: 137 MMHG | HEIGHT: 65 IN | DIASTOLIC BLOOD PRESSURE: 72 MMHG | HEART RATE: 83 BPM | OXYGEN SATURATION: 97 % | BODY MASS INDEX: 20.33 KG/M2 | RESPIRATION RATE: 18 BRPM

## 2024-10-25 DIAGNOSIS — Z12.11 COLON CANCER SCREENING: ICD-10-CM

## 2024-10-25 PROCEDURE — 3600007512: Performed by: INTERNAL MEDICINE

## 2024-10-25 PROCEDURE — 7100000010 HC PHASE II RECOVERY - FIRST 15 MIN: Performed by: INTERNAL MEDICINE

## 2024-10-25 PROCEDURE — 3600007502: Performed by: INTERNAL MEDICINE

## 2024-10-25 PROCEDURE — 88305 TISSUE EXAM BY PATHOLOGIST: CPT

## 2024-10-25 PROCEDURE — 3700000001 HC ADD 15 MINUTES (ANESTHESIA): Performed by: INTERNAL MEDICINE

## 2024-10-25 PROCEDURE — 6360000002 HC RX W HCPCS

## 2024-10-25 PROCEDURE — 3700000000 HC ANESTHESIA ATTENDED CARE: Performed by: INTERNAL MEDICINE

## 2024-10-25 PROCEDURE — 2709999900 HC NON-CHARGEABLE SUPPLY: Performed by: INTERNAL MEDICINE

## 2024-10-25 PROCEDURE — 7100000011 HC PHASE II RECOVERY - ADDTL 15 MIN: Performed by: INTERNAL MEDICINE

## 2024-10-25 RX ORDER — LIDOCAINE HYDROCHLORIDE 20 MG/ML
INJECTION, SOLUTION INTRAVENOUS
Status: DISCONTINUED | OUTPATIENT
Start: 2024-10-25 | End: 2024-10-25 | Stop reason: SDUPTHER

## 2024-10-25 RX ORDER — LIDOCAINE HYDROCHLORIDE 20 MG/ML
INJECTION, SOLUTION EPIDURAL; INFILTRATION; INTRACAUDAL; PERINEURAL
Status: DISCONTINUED
Start: 2024-10-25 | End: 2024-10-25 | Stop reason: HOSPADM

## 2024-10-25 RX ORDER — PROPOFOL 10 MG/ML
INJECTION, EMULSION INTRAVENOUS
Status: DISCONTINUED
Start: 2024-10-25 | End: 2024-10-25 | Stop reason: WASHOUT

## 2024-10-25 RX ORDER — PROPOFOL 10 MG/ML
INJECTION, EMULSION INTRAVENOUS
Status: DISCONTINUED
Start: 2024-10-25 | End: 2024-10-25 | Stop reason: HOSPADM

## 2024-10-25 RX ORDER — PROPOFOL 10 MG/ML
INJECTION, EMULSION INTRAVENOUS
Status: DISCONTINUED | OUTPATIENT
Start: 2024-10-25 | End: 2024-10-25 | Stop reason: SDUPTHER

## 2024-10-25 RX ADMIN — PROPOFOL 100 MG: 10 INJECTION, EMULSION INTRAVENOUS at 10:25

## 2024-10-25 RX ADMIN — PROPOFOL 20 MG: 10 INJECTION, EMULSION INTRAVENOUS at 10:32

## 2024-10-25 RX ADMIN — PROPOFOL 20 MG: 10 INJECTION, EMULSION INTRAVENOUS at 10:34

## 2024-10-25 RX ADMIN — PROPOFOL 20 MG: 10 INJECTION, EMULSION INTRAVENOUS at 10:30

## 2024-10-25 RX ADMIN — PROPOFOL 40 MG: 10 INJECTION, EMULSION INTRAVENOUS at 10:38

## 2024-10-25 RX ADMIN — LIDOCAINE HYDROCHLORIDE 100 MG: 20 INJECTION, SOLUTION INTRAVENOUS at 10:26

## 2024-10-25 ASSESSMENT — PAIN - FUNCTIONAL ASSESSMENT
PAIN_FUNCTIONAL_ASSESSMENT: NONE - DENIES PAIN
PAIN_FUNCTIONAL_ASSESSMENT: 0-10
PAIN_FUNCTIONAL_ASSESSMENT: NONE - DENIES PAIN

## 2024-10-25 NOTE — ANESTHESIA PRE PROCEDURE
Department of Anesthesiology  Preprocedure Note       Name:  Shelly Severino   Age:  74 y.o.  :  1950                                          MRN:  [id number]         Date:  10/25/2024      Surgeon: Surgeon(s):  Tanika Castillo MD    Procedure: Procedure(s):  COLONOSCOPY WITH BIOPSY, POLYPECTOMY    Medications prior to admission:   Prior to Admission medications    Medication Sig Start Date End Date Taking? Authorizing Provider   telmisartan (MICARDIS) 80 MG tablet Take 1 tablet by mouth daily 24  Yes Alyssa Campo DO   amLODIPine (NORVASC) 5 MG tablet Take 1 tablet by mouth daily 24  Yes Alyssa Campo DO   atorvastatin (LIPITOR) 40 MG tablet Take 1 tablet by mouth daily 24  Yes Alyssa Campo DO   latanoprost (XALATAN) 0.005 % ophthalmic solution Apply 1 drop to eye   Yes Automatic Reconciliation, Ar   vitamin E 1000 units capsule Take 1 capsule by mouth daily   Yes Automatic Reconciliation, Ar   alendronate (FOSAMAX) 70 MG tablet Take 1 tablet by mouth every 7 days 24   Alyssa Campo DO       Current medications:    Current Facility-Administered Medications   Medication Dose Route Frequency Provider Last Rate Last Admin   • lidocaine PF 2 % injection            • propofol 200 MG/20ML infusion                Allergies:    Allergies   Allergen Reactions   • Brimonidine Rash   • Dorzolamide Rash       Problem List:    Patient Active Problem List   Diagnosis Code   • Hyperlipidemia E78.5   • Osteoporosis M81.0   • Hypertension I10   • Other emphysema (HCC) J43.8   • Idiopathic peripheral neuropathy G60.9   • Open-angle glaucoma, moderate stage H40.10X2   • History of TIA (transient ischemic attack) Z86.73   • Abnormal findings on diagnostic imaging of breast R92.8   • Acne L70.9   • Allergic rhinitis due to pollen J30.1   • Anxiety F41.9   • Bronchitis, not specified as acute or chronic J40   • Carotid atherosclerosis I65.29   • Hypesthesia R20.1   • Impaired

## 2024-10-25 NOTE — PROGRESS NOTES
Discharge instructions printed and provided to patient and daughter sarita with all questions answered in full. PIV x1 removed with cath tip intact. Pt VSS and no signs of distress noted. Pt tolerating liquids and solids with no issues. Pt ambulating within room with no issues. Pt wheeled down to main entrance accompanied by staff. Pt condition stable at time of discharge.

## 2024-10-25 NOTE — ANESTHESIA POSTPROCEDURE EVALUATION
Department of Anesthesiology  Postprocedure Note    Patient: Shelly Severino  MRN: [id number]  YOB: 1950  Date of evaluation: 10/25/2024    Procedure Summary       Date: 10/25/24 Room / Location: [facility] ENDO 01 / [facility] ENDOSCOPY    Anesthesia Start: 1025 Anesthesia Stop: 1045    Procedure: COLONOSCOPY WITH BIOPSY, POLYPECTOMY (Lower GI Region) Diagnosis:       Colon cancer screening      (Colon cancer screening [Z12.11])    Surgeons: Tanika Castillo MD Responsible Provider: Vikash Noguera DO    Anesthesia Type: MAC, TIVA ASA Status: 2            Anesthesia Type: MAC, TIVA    Gerhard Phase I: Gerhard Score: 10    Gerhard Phase II:      Anesthesia Post Evaluation    Patient location during evaluation: bedside  Patient participation: complete - patient participated  Level of consciousness: awake  Pain score: 0  Airway patency: patent  Nausea & Vomiting: no vomiting and no nausea  Cardiovascular status: hemodynamically stable  Respiratory status: acceptable  Hydration status: stable  Pain management: adequate    No notable events documented.

## 2024-11-05 ENCOUNTER — HOSPITAL ENCOUNTER (OUTPATIENT)
Facility: HOSPITAL | Age: 74
Discharge: HOME | End: 2024-11-08
Attending: FAMILY MEDICINE
Payer: MEDICARE

## 2024-11-05 DIAGNOSIS — Z87.891 PERSONAL HISTORY OF TOBACCO USE: ICD-10-CM

## 2024-11-05 DIAGNOSIS — Z12.2 SCREENING FOR LUNG CANCER: ICD-10-CM

## 2024-11-05 PROCEDURE — 71271 CT THORAX LUNG CANCER SCR C-: CPT

## 2024-12-13 ENCOUNTER — HOSPITAL ENCOUNTER (OUTPATIENT)
Facility: HOSPITAL | Age: 74
Discharge: HOME | End: 2024-12-16
Attending: FAMILY MEDICINE
Payer: MEDICARE

## 2024-12-13 DIAGNOSIS — Z12.31 VISIT FOR SCREENING MAMMOGRAM: ICD-10-CM

## 2024-12-13 DIAGNOSIS — M81.0 AGE-RELATED OSTEOPOROSIS WITHOUT CURRENT PATHOLOGICAL FRACTURE: ICD-10-CM

## 2024-12-13 PROCEDURE — 77080 DXA BONE DENSITY AXIAL: CPT

## 2024-12-13 PROCEDURE — 77067 SCR MAMMO BI INCL CAD: CPT

## 2024-12-24 ENCOUNTER — TELEPHONE (OUTPATIENT)
Facility: CLINIC | Age: 74
End: 2024-12-24

## 2024-12-24 NOTE — TELEPHONE ENCOUNTER
Called patient and left vm that I would send a my chart message with the message from the provider to the patient.     My chart message sent and provider notified.

## 2024-12-24 NOTE — TELEPHONE ENCOUNTER
----- Message from CARMEN Greco sent at 12/24/2024 11:29 AM EST -----  Good morning,     Your mammogram was negative and recommend yearly screening.     Gem Berrios

## 2024-12-24 NOTE — TELEPHONE ENCOUNTER
----- Message from CARMEN Greco sent at 12/24/2024 11:32 AM EST -----  Good morning,     My name is Gem and I am helping to cover Dr. Campo's inbasket.     Your Dexa scan appear to have been unchanged since 2 years ago, meaning it does not appear to show any difference with the fosamax. I am also asuming you have been on fosamax since your last screening. Is this correct?     If so, I may want to refer you to endocrinology for additional management since the Fosamax has not showed any improvement.     Let me know,   Gem

## 2025-01-13 DIAGNOSIS — E78.5 HYPERLIPIDEMIA, UNSPECIFIED HYPERLIPIDEMIA TYPE: ICD-10-CM

## 2025-01-13 NOTE — TELEPHONE ENCOUNTER
Method of communication:  []Fax [x]Phone call []In person   []Other:    Who is making request:Patient   What medication/s (include strength and dosing):    Lipitor 40 mg tablet    This is for a:   [x]Refill    []New medication request  []Follow up on prior request    [facility]  Best contact for patient:[phone]    Additional notes:

## 2025-01-13 NOTE — TELEPHONE ENCOUNTER
Requested Prescriptions     Pending Prescriptions Disp Refills    atorvastatin (LIPITOR) 40 MG tablet 90 tablet 1     Sig: Take 1 tablet by mouth daily     Date of last OV:08/12/2024  Future OV visit scheduled:  [] Yes -> Date:   [x] No    Last Refill: [] N/A  Date:7/19/24  Qty:90  # of refills:1    Med pending for provider review:  [x] Yes  [] No (provide reason why):     Requested Pharmacy updated in ENC:  [x] Yes    Applicable labs (provide date of completion):  [] Diabetic med- A1c:  [x] Cholesterol med- Lipids:  [x] BP med- CMP or BMP:   [] Thyroid med- TSH:  [] Gout med- Uric acid:  [] Prostate med- PSA:  [] Other (provide what type of med and lab):  8/12/2024  Additional notes:

## 2025-01-14 RX ORDER — ATORVASTATIN CALCIUM 40 MG/1
40 TABLET, FILM COATED ORAL DAILY
Qty: 90 TABLET | Refills: 1 | Status: SHIPPED | OUTPATIENT
Start: 2025-01-14

## 2025-02-25 DIAGNOSIS — I10 PRIMARY HYPERTENSION: ICD-10-CM

## 2025-02-25 RX ORDER — TELMISARTAN 80 MG/1
80 TABLET ORAL DAILY
Qty: 90 TABLET | Refills: 1 | Status: SHIPPED | OUTPATIENT
Start: 2025-02-25

## 2025-02-25 RX ORDER — AMLODIPINE BESYLATE 5 MG/1
5 TABLET ORAL DAILY
Qty: 90 TABLET | Refills: 1 | Status: SHIPPED | OUTPATIENT
Start: 2025-02-25

## 2025-02-25 NOTE — TELEPHONE ENCOUNTER
Method of communication:  []Fax [x]Phone call []In person   []Other:    Who is making request: pt  What medication/s (include strength and dosing):  Micardis 80mg  Amlodipine 5mg    This is for a:   [x]Refill    []New medication request  []Follow up on prior request    [facility]  Best contact for patient: [phone]    Additional notes: Please call patient once sent

## 2025-02-25 NOTE — TELEPHONE ENCOUNTER
Requested Prescriptions     Pending Prescriptions Disp Refills    telmisartan (MICARDIS) 80 MG tablet 90 tablet 1     Sig: Take 1 tablet by mouth daily    amLODIPine (NORVASC) 5 MG tablet 90 tablet 1     Sig: Take 1 tablet by mouth daily          Date of last OV:8/12/224  Future OV visit scheduled:  [] Yes -> Date:   [x] No    Last Refill: [] N/A  Date:  Qty:  # of refills:    Med pending for provider review:  [] Yes  [] No (provide reason why):     Requested Pharmacy updated in ENC:  [] Yes    Applicable labs (provide date of completion):  [] Diabetic med- A1c:  [] Cholesterol med- Lipids:  [] BP med- CMP or BMP:   [] Thyroid med- TSH:  [] Gout med- Uric acid:  [] Prostate med- PSA:  [] Other (provide what type of med and lab):    Additional notes:

## 2025-07-14 DIAGNOSIS — M81.0 AGE-RELATED OSTEOPOROSIS WITHOUT CURRENT PATHOLOGICAL FRACTURE: ICD-10-CM

## 2025-07-14 DIAGNOSIS — E78.5 HYPERLIPIDEMIA, UNSPECIFIED HYPERLIPIDEMIA TYPE: ICD-10-CM

## 2025-07-14 RX ORDER — ALENDRONATE SODIUM 70 MG/1
70 TABLET ORAL
Qty: 12 TABLET | Refills: 3 | Status: SHIPPED | OUTPATIENT
Start: 2025-07-14 | End: 2025-07-14 | Stop reason: CLARIF

## 2025-07-14 NOTE — TELEPHONE ENCOUNTER
Method of communication:  []Fax []Phone call []In person   []Other:    Who is making request:Patient  What medication/s (include strength and dosing):    Atorvastatin 40 mg tablet    Alendronate 70 mg tablet    This is for a:   [x]Refill    []New medication request  []Follow up on prior request    [facility]    Best contact for patient:[phone]    Additional notes:

## 2025-07-18 RX ORDER — ALENDRONATE SODIUM 70 MG/1
70 TABLET ORAL
Qty: 12 TABLET | Refills: 0 | Status: SHIPPED | OUTPATIENT
Start: 2025-07-18

## 2025-07-18 RX ORDER — ATORVASTATIN CALCIUM 40 MG/1
40 TABLET, FILM COATED ORAL DAILY
Qty: 90 TABLET | Refills: 0 | Status: SHIPPED | OUTPATIENT
Start: 2025-07-18

## 2025-08-22 ENCOUNTER — OFFICE VISIT (OUTPATIENT)
Facility: CLINIC | Age: 75
End: 2025-08-22
Payer: MEDICARE

## 2025-08-22 VITALS
HEART RATE: 86 BPM | HEIGHT: 64 IN | RESPIRATION RATE: 16 BRPM | SYSTOLIC BLOOD PRESSURE: 152 MMHG | OXYGEN SATURATION: 97 % | BODY MASS INDEX: 22.53 KG/M2 | WEIGHT: 132 LBS | TEMPERATURE: 98.5 F | DIASTOLIC BLOOD PRESSURE: 71 MMHG

## 2025-08-22 DIAGNOSIS — E78.5 HYPERLIPIDEMIA, UNSPECIFIED HYPERLIPIDEMIA TYPE: ICD-10-CM

## 2025-08-22 DIAGNOSIS — M81.0 AGE-RELATED OSTEOPOROSIS WITHOUT CURRENT PATHOLOGICAL FRACTURE: ICD-10-CM

## 2025-08-22 DIAGNOSIS — H91.90 HEARING LOSS, UNSPECIFIED HEARING LOSS TYPE, UNSPECIFIED LATERALITY: ICD-10-CM

## 2025-08-22 DIAGNOSIS — H61.22 IMPACTED CERUMEN OF LEFT EAR: ICD-10-CM

## 2025-08-22 DIAGNOSIS — J43.8 OTHER EMPHYSEMA (HCC): ICD-10-CM

## 2025-08-22 DIAGNOSIS — Z01.818 PRE-OP EVALUATION: ICD-10-CM

## 2025-08-22 DIAGNOSIS — Z00.00 MEDICARE ANNUAL WELLNESS VISIT, SUBSEQUENT: Primary | ICD-10-CM

## 2025-08-22 DIAGNOSIS — I10 PRIMARY HYPERTENSION: ICD-10-CM

## 2025-08-22 PROBLEM — R73.01 IMPAIRED FASTING GLUCOSE: Status: RESOLVED | Noted: 2023-08-11 | Resolved: 2025-08-22

## 2025-08-22 PROBLEM — R92.8 ABNORMAL FINDINGS ON DIAGNOSTIC IMAGING OF BREAST: Status: RESOLVED | Noted: 2023-08-11 | Resolved: 2025-08-22

## 2025-08-22 PROBLEM — J44.9 CHRONIC OBSTRUCTIVE PULMONARY DISEASE, UNSPECIFIED (HCC): Status: ACTIVE | Noted: 2025-08-22

## 2025-08-22 PROCEDURE — G8399 PT W/DXA RESULTS DOCUMENT: HCPCS | Performed by: FAMILY MEDICINE

## 2025-08-22 PROCEDURE — 3023F SPIROM DOC REV: CPT | Performed by: FAMILY MEDICINE

## 2025-08-22 PROCEDURE — 3017F COLORECTAL CA SCREEN DOC REV: CPT | Performed by: FAMILY MEDICINE

## 2025-08-22 PROCEDURE — G8427 DOCREV CUR MEDS BY ELIG CLIN: HCPCS | Performed by: FAMILY MEDICINE

## 2025-08-22 PROCEDURE — 1123F ACP DISCUSS/DSCN MKR DOCD: CPT | Performed by: FAMILY MEDICINE

## 2025-08-22 PROCEDURE — 1090F PRES/ABSN URINE INCON ASSESS: CPT | Performed by: FAMILY MEDICINE

## 2025-08-22 PROCEDURE — 1159F MED LIST DOCD IN RCRD: CPT | Performed by: FAMILY MEDICINE

## 2025-08-22 PROCEDURE — 1036F TOBACCO NON-USER: CPT | Performed by: FAMILY MEDICINE

## 2025-08-22 PROCEDURE — 1126F AMNT PAIN NOTED NONE PRSNT: CPT | Performed by: FAMILY MEDICINE

## 2025-08-22 PROCEDURE — 99213 OFFICE O/P EST LOW 20 MIN: CPT | Performed by: FAMILY MEDICINE

## 2025-08-22 PROCEDURE — 3078F DIAST BP <80 MM HG: CPT | Performed by: FAMILY MEDICINE

## 2025-08-22 PROCEDURE — 1160F RVW MEDS BY RX/DR IN RCRD: CPT | Performed by: FAMILY MEDICINE

## 2025-08-22 PROCEDURE — G0439 PPPS, SUBSEQ VISIT: HCPCS | Performed by: FAMILY MEDICINE

## 2025-08-22 PROCEDURE — G8420 CALC BMI NORM PARAMETERS: HCPCS | Performed by: FAMILY MEDICINE

## 2025-08-22 PROCEDURE — 3077F SYST BP >= 140 MM HG: CPT | Performed by: FAMILY MEDICINE

## 2025-08-22 RX ORDER — ASPIRIN 81 MG/1
81 TABLET ORAL DAILY
COMMUNITY

## 2025-08-22 RX ORDER — TELMISARTAN 80 MG/1
80 TABLET ORAL DAILY
Qty: 90 TABLET | Refills: 1 | Status: SHIPPED | OUTPATIENT
Start: 2025-08-22

## 2025-08-22 RX ORDER — ATORVASTATIN CALCIUM 40 MG/1
TABLET, FILM COATED ORAL
COMMUNITY
End: 2025-08-22

## 2025-08-22 RX ORDER — AMLODIPINE BESYLATE 5 MG/1
5 TABLET ORAL DAILY
Qty: 90 TABLET | Refills: 1 | Status: SHIPPED | OUTPATIENT
Start: 2025-08-22

## 2025-08-22 ASSESSMENT — PATIENT HEALTH QUESTIONNAIRE - PHQ9
SUM OF ALL RESPONSES TO PHQ QUESTIONS 1-9: 0
2. FEELING DOWN, DEPRESSED OR HOPELESS: NOT AT ALL
1. LITTLE INTEREST OR PLEASURE IN DOING THINGS: NOT AT ALL

## 2025-08-26 LAB
25(OH)D3+25(OH)D2 SERPL-MCNC: 61.6 NG/ML (ref 30–100)
ALBUMIN SERPL-MCNC: 4.4 G/DL (ref 3.8–4.8)
ALP SERPL-CCNC: 54 IU/L (ref 44–121)
ALT SERPL-CCNC: 16 IU/L (ref 0–32)
AST SERPL-CCNC: 18 IU/L (ref 0–40)
BILIRUB SERPL-MCNC: 0.3 MG/DL (ref 0–1.2)
BUN SERPL-MCNC: 18 MG/DL (ref 8–27)
BUN/CREAT SERPL: 25 (ref 12–28)
CALCIUM SERPL-MCNC: 9.1 MG/DL (ref 8.7–10.3)
CHLORIDE SERPL-SCNC: 99 MMOL/L (ref 96–106)
CHOLEST SERPL-MCNC: 204 MG/DL (ref 100–199)
CO2 SERPL-SCNC: 23 MMOL/L (ref 20–29)
CREAT SERPL-MCNC: 0.72 MG/DL (ref 0.57–1)
EGFRCR SERPLBLD CKD-EPI 2021: 88 ML/MIN/1.73
ERYTHROCYTE [DISTWIDTH] IN BLOOD BY AUTOMATED COUNT: 12.5 % (ref 11.7–15.4)
GLOBULIN SER CALC-MCNC: 1.9 G/DL (ref 1.5–4.5)
GLUCOSE SERPL-MCNC: 91 MG/DL (ref 70–99)
HCT VFR BLD AUTO: 36.5 % (ref 34–46.6)
HDLC SERPL-MCNC: 78 MG/DL
HGB BLD-MCNC: 11 G/DL (ref 11.1–15.9)
LDLC SERPL CALC-MCNC: 111 MG/DL (ref 0–99)
MCH RBC QN AUTO: 28.9 PG (ref 26.6–33)
MCHC RBC AUTO-ENTMCNC: 30.1 G/DL (ref 31.5–35.7)
MCV RBC AUTO: 96 FL (ref 79–97)
PLATELET # BLD AUTO: 371 X10E3/UL (ref 150–450)
POTASSIUM SERPL-SCNC: 4.5 MMOL/L (ref 3.5–5.2)
PROT SERPL-MCNC: 6.3 G/DL (ref 6–8.5)
RBC # BLD AUTO: 3.8 X10E6/UL (ref 3.77–5.28)
SODIUM SERPL-SCNC: 137 MMOL/L (ref 134–144)
TRIGL SERPL-MCNC: 83 MG/DL (ref 0–149)
VLDLC SERPL CALC-MCNC: 15 MG/DL (ref 5–40)
WBC # BLD AUTO: 7.5 X10E3/UL (ref 3.4–10.8)

## 2025-09-05 ENCOUNTER — TELEPHONE (OUTPATIENT)
Facility: CLINIC | Age: 75
End: 2025-09-05

## (undated) DEVICE — MASK ANES INF SZ 2 PREM TAIL VLV INFL PRT UNSCENTED SGL PT

## (undated) DEVICE — MASK O2 MED AD 7 FT 3 IN 1 W/ STD CONN LTX

## (undated) DEVICE — LINE SAMPLING AD NSL O2 TBNG MICROSTREAM ADV FILTERLINE MVAO

## (undated) DEVICE — KIT ENDOSCOPIC  PROC VIA

## (undated) DEVICE — FORCEPS BX 240CM 2.4MM L NDL RAD JAW 4 M00513334